# Patient Record
Sex: MALE | Race: BLACK OR AFRICAN AMERICAN | NOT HISPANIC OR LATINO | URBAN - METROPOLITAN AREA
[De-identification: names, ages, dates, MRNs, and addresses within clinical notes are randomized per-mention and may not be internally consistent; named-entity substitution may affect disease eponyms.]

---

## 2017-10-19 PROBLEM — Z00.00 ENCOUNTER FOR PREVENTIVE HEALTH EXAMINATION: Status: ACTIVE | Noted: 2017-10-19

## 2019-10-30 ENCOUNTER — INPATIENT (INPATIENT)
Facility: HOSPITAL | Age: 84
LOS: 4 days | Discharge: HOME HEALTH | End: 2019-11-04
Attending: HOSPITALIST | Admitting: HOSPITALIST
Payer: MEDICARE

## 2019-10-30 VITALS
RESPIRATION RATE: 18 BRPM | SYSTOLIC BLOOD PRESSURE: 128 MMHG | DIASTOLIC BLOOD PRESSURE: 79 MMHG | HEART RATE: 89 BPM | TEMPERATURE: 98 F

## 2019-10-30 DIAGNOSIS — Z29.9 ENCOUNTER FOR PROPHYLACTIC MEASURES, UNSPECIFIED: ICD-10-CM

## 2019-10-30 DIAGNOSIS — E11.9 TYPE 2 DIABETES MELLITUS WITHOUT COMPLICATIONS: ICD-10-CM

## 2019-10-30 DIAGNOSIS — Z87.81 PERSONAL HISTORY OF (HEALED) TRAUMATIC FRACTURE: Chronic | ICD-10-CM

## 2019-10-30 DIAGNOSIS — L03.90 CELLULITIS, UNSPECIFIED: ICD-10-CM

## 2019-10-30 DIAGNOSIS — N18.9 CHRONIC KIDNEY DISEASE, UNSPECIFIED: ICD-10-CM

## 2019-10-30 LAB
ALBUMIN SERPL ELPH-MCNC: 3.8 G/DL — SIGNIFICANT CHANGE UP (ref 3.3–5)
ALBUMIN SERPL ELPH-MCNC: 4.1 G/DL — SIGNIFICANT CHANGE UP (ref 3.3–5)
ALP SERPL-CCNC: 83 U/L — SIGNIFICANT CHANGE UP (ref 40–120)
ALP SERPL-CCNC: 93 U/L — SIGNIFICANT CHANGE UP (ref 40–120)
ALT FLD-CCNC: 18 U/L — SIGNIFICANT CHANGE UP (ref 4–41)
ALT FLD-CCNC: 23 U/L — SIGNIFICANT CHANGE UP (ref 4–41)
ANION GAP SERPL CALC-SCNC: 12 MMO/L — SIGNIFICANT CHANGE UP (ref 7–14)
ANION GAP SERPL CALC-SCNC: 13 MMO/L — SIGNIFICANT CHANGE UP (ref 7–14)
ANION GAP SERPL CALC-SCNC: 13 MMO/L — SIGNIFICANT CHANGE UP (ref 7–14)
AST SERPL-CCNC: 17 U/L — SIGNIFICANT CHANGE UP (ref 4–40)
AST SERPL-CCNC: 36 U/L — SIGNIFICANT CHANGE UP (ref 4–40)
BASE EXCESS BLDV CALC-SCNC: 3.2 MMOL/L — SIGNIFICANT CHANGE UP
BASOPHILS # BLD AUTO: 0.03 K/UL — SIGNIFICANT CHANGE UP (ref 0–0.2)
BASOPHILS NFR BLD AUTO: 0.3 % — SIGNIFICANT CHANGE UP (ref 0–2)
BILIRUB SERPL-MCNC: 0.5 MG/DL — SIGNIFICANT CHANGE UP (ref 0.2–1.2)
BILIRUB SERPL-MCNC: 0.5 MG/DL — SIGNIFICANT CHANGE UP (ref 0.2–1.2)
BLOOD GAS VENOUS - CREATININE: 1.42 MG/DL — HIGH (ref 0.5–1.3)
BUN SERPL-MCNC: 23 MG/DL — SIGNIFICANT CHANGE UP (ref 7–23)
BUN SERPL-MCNC: 23 MG/DL — SIGNIFICANT CHANGE UP (ref 7–23)
BUN SERPL-MCNC: 24 MG/DL — HIGH (ref 7–23)
CALCIUM SERPL-MCNC: 9.4 MG/DL — SIGNIFICANT CHANGE UP (ref 8.4–10.5)
CALCIUM SERPL-MCNC: 9.4 MG/DL — SIGNIFICANT CHANGE UP (ref 8.4–10.5)
CALCIUM SERPL-MCNC: 9.7 MG/DL — SIGNIFICANT CHANGE UP (ref 8.4–10.5)
CHLORIDE BLDV-SCNC: 107 MMOL/L — SIGNIFICANT CHANGE UP (ref 96–108)
CHLORIDE SERPL-SCNC: 102 MMOL/L — SIGNIFICANT CHANGE UP (ref 98–107)
CHLORIDE SERPL-SCNC: 103 MMOL/L — SIGNIFICANT CHANGE UP (ref 98–107)
CHLORIDE SERPL-SCNC: 103 MMOL/L — SIGNIFICANT CHANGE UP (ref 98–107)
CO2 SERPL-SCNC: 26 MMOL/L — SIGNIFICANT CHANGE UP (ref 22–31)
CO2 SERPL-SCNC: 26 MMOL/L — SIGNIFICANT CHANGE UP (ref 22–31)
CO2 SERPL-SCNC: 27 MMOL/L — SIGNIFICANT CHANGE UP (ref 22–31)
CREAT SERPL-MCNC: 1.52 MG/DL — HIGH (ref 0.5–1.3)
CREAT SERPL-MCNC: 1.55 MG/DL — HIGH (ref 0.5–1.3)
CREAT SERPL-MCNC: 1.55 MG/DL — HIGH (ref 0.5–1.3)
EOSINOPHIL # BLD AUTO: 0.17 K/UL — SIGNIFICANT CHANGE UP (ref 0–0.5)
EOSINOPHIL NFR BLD AUTO: 1.6 % — SIGNIFICANT CHANGE UP (ref 0–6)
GAS PNL BLDV: 138 MMOL/L — SIGNIFICANT CHANGE UP (ref 136–146)
GLUCOSE BLDV-MCNC: 120 MG/DL — HIGH (ref 70–99)
GLUCOSE SERPL-MCNC: 121 MG/DL — HIGH (ref 70–99)
GLUCOSE SERPL-MCNC: 125 MG/DL — HIGH (ref 70–99)
GLUCOSE SERPL-MCNC: 192 MG/DL — HIGH (ref 70–99)
HCO3 BLDV-SCNC: 24 MMOL/L — SIGNIFICANT CHANGE UP (ref 20–27)
HCT VFR BLD CALC: 45.7 % — SIGNIFICANT CHANGE UP (ref 39–50)
HCT VFR BLDV CALC: 48.8 % — SIGNIFICANT CHANGE UP (ref 39–51)
HGB BLD-MCNC: 15.5 G/DL — SIGNIFICANT CHANGE UP (ref 13–17)
HGB BLDV-MCNC: 16 G/DL — SIGNIFICANT CHANGE UP (ref 13–17)
IMM GRANULOCYTES NFR BLD AUTO: 0.7 % — SIGNIFICANT CHANGE UP (ref 0–1.5)
LACTATE BLDV-MCNC: 2.1 MMOL/L — HIGH (ref 0.5–2)
LYMPHOCYTES # BLD AUTO: 2.27 K/UL — SIGNIFICANT CHANGE UP (ref 1–3.3)
LYMPHOCYTES # BLD AUTO: 20.9 % — SIGNIFICANT CHANGE UP (ref 13–44)
MAGNESIUM SERPL-MCNC: 2 MG/DL — SIGNIFICANT CHANGE UP (ref 1.6–2.6)
MCHC RBC-ENTMCNC: 32.8 PG — SIGNIFICANT CHANGE UP (ref 27–34)
MCHC RBC-ENTMCNC: 33.9 % — SIGNIFICANT CHANGE UP (ref 32–36)
MCV RBC AUTO: 96.8 FL — SIGNIFICANT CHANGE UP (ref 80–100)
MONOCYTES # BLD AUTO: 0.96 K/UL — HIGH (ref 0–0.9)
MONOCYTES NFR BLD AUTO: 8.8 % — SIGNIFICANT CHANGE UP (ref 2–14)
NEUTROPHILS # BLD AUTO: 7.35 K/UL — SIGNIFICANT CHANGE UP (ref 1.8–7.4)
NEUTROPHILS NFR BLD AUTO: 67.7 % — SIGNIFICANT CHANGE UP (ref 43–77)
NRBC # FLD: 0 K/UL — SIGNIFICANT CHANGE UP (ref 0–0)
PCO2 BLDV: 58 MMHG — HIGH (ref 41–51)
PH BLDV: 7.32 PH — SIGNIFICANT CHANGE UP (ref 7.32–7.43)
PHOSPHATE SERPL-MCNC: 3.5 MG/DL — SIGNIFICANT CHANGE UP (ref 2.5–4.5)
PLATELET # BLD AUTO: 263 K/UL — SIGNIFICANT CHANGE UP (ref 150–400)
PMV BLD: 9.1 FL — SIGNIFICANT CHANGE UP (ref 7–13)
PO2 BLDV: 25 MMHG — LOW (ref 35–40)
POTASSIUM BLDV-SCNC: 5.1 MMOL/L — HIGH (ref 3.4–4.5)
POTASSIUM SERPL-MCNC: 4.3 MMOL/L — SIGNIFICANT CHANGE UP (ref 3.5–5.3)
POTASSIUM SERPL-MCNC: 4.6 MMOL/L — SIGNIFICANT CHANGE UP (ref 3.5–5.3)
POTASSIUM SERPL-MCNC: 5.9 MMOL/L — HIGH (ref 3.5–5.3)
POTASSIUM SERPL-SCNC: 4.3 MMOL/L — SIGNIFICANT CHANGE UP (ref 3.5–5.3)
POTASSIUM SERPL-SCNC: 4.6 MMOL/L — SIGNIFICANT CHANGE UP (ref 3.5–5.3)
POTASSIUM SERPL-SCNC: 5.9 MMOL/L — HIGH (ref 3.5–5.3)
PROT SERPL-MCNC: 7.5 G/DL — SIGNIFICANT CHANGE UP (ref 6–8.3)
PROT SERPL-MCNC: 8.5 G/DL — HIGH (ref 6–8.3)
RBC # BLD: 4.72 M/UL — SIGNIFICANT CHANGE UP (ref 4.2–5.8)
RBC # FLD: 12.2 % — SIGNIFICANT CHANGE UP (ref 10.3–14.5)
SAO2 % BLDV: 32.2 % — LOW (ref 60–85)
SODIUM SERPL-SCNC: 141 MMOL/L — SIGNIFICANT CHANGE UP (ref 135–145)
SODIUM SERPL-SCNC: 142 MMOL/L — SIGNIFICANT CHANGE UP (ref 135–145)
SODIUM SERPL-SCNC: 142 MMOL/L — SIGNIFICANT CHANGE UP (ref 135–145)
WBC # BLD: 10.86 K/UL — HIGH (ref 3.8–10.5)
WBC # FLD AUTO: 10.86 K/UL — HIGH (ref 3.8–10.5)

## 2019-10-30 PROCEDURE — 73630 X-RAY EXAM OF FOOT: CPT | Mod: 26,RT

## 2019-10-30 PROCEDURE — 93971 EXTREMITY STUDY: CPT | Mod: 26,LT

## 2019-10-30 PROCEDURE — 73610 X-RAY EXAM OF ANKLE: CPT | Mod: 26,RT

## 2019-10-30 PROCEDURE — 99223 1ST HOSP IP/OBS HIGH 75: CPT

## 2019-10-30 RX ORDER — INSULIN LISPRO 100/ML
VIAL (ML) SUBCUTANEOUS
Refills: 0 | Status: DISCONTINUED | OUTPATIENT
Start: 2019-10-30 | End: 2019-11-04

## 2019-10-30 RX ORDER — SODIUM CHLORIDE 9 MG/ML
1000 INJECTION, SOLUTION INTRAVENOUS
Refills: 0 | Status: DISCONTINUED | OUTPATIENT
Start: 2019-10-30 | End: 2019-11-04

## 2019-10-30 RX ORDER — GLUCAGON INJECTION, SOLUTION 0.5 MG/.1ML
1 INJECTION, SOLUTION SUBCUTANEOUS ONCE
Refills: 0 | Status: DISCONTINUED | OUTPATIENT
Start: 2019-10-30 | End: 2019-11-04

## 2019-10-30 RX ORDER — DEXTROSE 50 % IN WATER 50 %
25 SYRINGE (ML) INTRAVENOUS ONCE
Refills: 0 | Status: DISCONTINUED | OUTPATIENT
Start: 2019-10-30 | End: 2019-11-04

## 2019-10-30 RX ORDER — SODIUM CHLORIDE 9 MG/ML
1000 INJECTION INTRAMUSCULAR; INTRAVENOUS; SUBCUTANEOUS
Refills: 0 | Status: COMPLETED | OUTPATIENT
Start: 2019-10-30 | End: 2019-10-30

## 2019-10-30 RX ORDER — DEXTROSE 50 % IN WATER 50 %
12.5 SYRINGE (ML) INTRAVENOUS ONCE
Refills: 0 | Status: DISCONTINUED | OUTPATIENT
Start: 2019-10-30 | End: 2019-11-04

## 2019-10-30 RX ORDER — SITAGLIPTIN 50 MG/1
1 TABLET, FILM COATED ORAL
Qty: 0 | Refills: 0 | DISCHARGE

## 2019-10-30 RX ORDER — INSULIN LISPRO 100/ML
VIAL (ML) SUBCUTANEOUS AT BEDTIME
Refills: 0 | Status: DISCONTINUED | OUTPATIENT
Start: 2019-10-30 | End: 2019-11-04

## 2019-10-30 RX ORDER — DEXTROSE 50 % IN WATER 50 %
15 SYRINGE (ML) INTRAVENOUS ONCE
Refills: 0 | Status: DISCONTINUED | OUTPATIENT
Start: 2019-10-30 | End: 2019-11-04

## 2019-10-30 RX ORDER — HEPARIN SODIUM 5000 [USP'U]/ML
5000 INJECTION INTRAVENOUS; SUBCUTANEOUS EVERY 8 HOURS
Refills: 0 | Status: DISCONTINUED | OUTPATIENT
Start: 2019-10-30 | End: 2019-11-04

## 2019-10-30 RX ORDER — IBUPROFEN 200 MG
400 TABLET ORAL ONCE
Refills: 0 | Status: COMPLETED | OUTPATIENT
Start: 2019-10-30 | End: 2019-10-30

## 2019-10-30 RX ADMIN — Medication 100 MILLIGRAM(S): at 18:28

## 2019-10-30 RX ADMIN — SODIUM CHLORIDE 100 MILLILITER(S): 9 INJECTION INTRAMUSCULAR; INTRAVENOUS; SUBCUTANEOUS at 22:20

## 2019-10-30 RX ADMIN — Medication 400 MILLIGRAM(S): at 21:50

## 2019-10-30 NOTE — H&P ADULT - PROBLEM SELECTOR PLAN 3
- Unclear baseline.  - Trend Cr level.  - Will give gentle IV hydration overnight given clinical exam findings and mildly elevated lactate.

## 2019-10-30 NOTE — ED PROVIDER NOTE - PHYSICAL EXAMINATION
Vital signs reviewed.   CONSTITUTIONAL: Well-appearing; well-nourished; in no apparent distress. Non-toxic appearing.   HEAD: Normocephalic, atraumatic.  EYES: PERRL, EOM intact, conjunctiva and sclera WNL.  ENT: normal nose; no rhinorrhea;  NECK: Trachea midline   CARD: Normal S1, S2; no murmurs, rubs, or gallops noted.  RESP: Normal chest excursion with respiration; breath sounds clear and equal bilaterally; no wheezes, rhonchi, or rales.  EXT/MS: moves all extremities; distal pulses are normal, +RLE swelling extending from calf to foot. +pain noted to posterior calf. Also noted to have dorsal skin dermatitis with oozing. +mild warmth noted to anterior ankle. No crepitus.   SKIN: Normal for age and race; warm; dry; good turgor; See Ext section .  NEURO: Awake, alert, oriented x 3, no gross deficits, no motor or sensory deficit noted.  PSYCH: Normal mood; appropriate affect.

## 2019-10-30 NOTE — H&P ADULT - NSHPLABSRESULTS_GEN_ALL_CORE
(10-30 @ 17:37)                      15.5  10.86 )-----------( 263                 45.7    Neutrophils = 7.35 (67.7%)  Lymphocytes = 2.27 (20.9%)  Eosinophils = 0.17 (1.6%)  Basophils = 0.03 (0.3%)  Monocytes = 0.96 (8.8%)  Bands = --%    10-30    141  |  103  |  24<H>  ----------------------------<  125<H>  5.9<H>   |  26  |  1.52<H>    Ca    9.7      30 Oct 2019 17:37    TPro  8.5<H>  /  Alb  4.1  /  TBili  0.5  /  DBili  x   /  AST  36  /  ALT  23  /  AlkPhos  93  10-30          RVP:    Venous Blood Gas:  10-30 @ 17:37  7.32/58/25/24/32.2  VBG Lactate: 2.1    Xray: reviewed. No acute findings.

## 2019-10-30 NOTE — H&P ADULT - ASSESSMENT
87 y.o male with PMHx of DM who presents to ED with daughter c/o RLE swelling x 1 week and oozing of skin x 3 days. Concerning for LE cellulitis.

## 2019-10-30 NOTE — ED ADULT NURSE NOTE - OBJECTIVE STATEMENT
patient alert ox3 came in c/o pain to right foot with ulcers since 1 week. denies having fever, NAD. breathing even and unlabored. labs done as ordered. awaiting results and re eval.

## 2019-10-30 NOTE — ED PROVIDER NOTE - CLINICAL SUMMARY MEDICAL DECISION MAKING FREE TEXT BOX
Patient is a 87 y.o male with PMHx of DM, Rt ankle fx who presents to ED with daughter c/o RLE swelling x 1 week and oozing of skin x 3 days.  DDx- dermatitis with cellulitis. R/o DVT. plan- labs, xray , Doppler, IV clinda, will monitor and reassess

## 2019-10-30 NOTE — H&P ADULT - PROBLEM SELECTOR PLAN 1
- Likely 2/2 chronic venous skin changes and bacterial infection.   - Treat empirically with IV abx clinda.  - Trend fever and leukocytosis curves. If worsening sx, will send BCx.  - Wound care consult.

## 2019-10-30 NOTE — ED PROVIDER NOTE - OBJECTIVE STATEMENT
HPI: Patient is a 87 y.o male with PMHx of DM, Rt ankle fx who presents to ED with daughter c/o RLE swelling x 1 week and oozing of skin x 3 days.  Pt recently in [facility] x 1 month, returned last night. Notes that for approx. 1 week he has had RLE swelling from calf to foot. Then 3 days ago he noted flaking of skin with oozing, Pt notes pain making it difficult to ambulate. Pt previously had sx to Rt ankle 4 yrs ago, states rods were placed but then removed 1 year later. Pt denies fevers, chills, cp, sob, pleuritic pain, numbness or tingling, weakness or any other complaints.

## 2019-10-30 NOTE — H&P ADULT - NSHPPHYSICALEXAM_GEN_ALL_CORE
T(C): 36.6 (10-30-19 @ 14:11), Max: 36.6 (10-30-19 @ 14:11)  HR: 85 (10-30-19 @ 17:47) (85 - 89)  BP: 118/60 (10-30-19 @ 17:47) (118/60 - 128/79)  RR: 17 (10-30-19 @ 17:47) (17 - 18)  SpO2: 98% (10-30-19 @ 17:47) (98% - 98%)  Wt(kg): --  GENERAL: NAD, well-developed  HEAD:  Atraumatic, Normocephalic  EYES: EOMI, PERRLA, conjunctiva and sclera clear  NECK: Supple, No JVD  CHEST/LUNG: Clear to auscultation bilaterally; No wheeze  HEART: Regular rate and rhythm; No murmurs, rubs, or gallops  ABDOMEN: Soft, Nontender, Nondistended; Bowel sounds present  EXTREMITIES:  Left LE swelling, skin warm to touch in foot and ankle area. Scaling overlying skin.  PSYCH: AAOx3  NEUROLOGY: non-focal  SKIN: No rashes or lesions

## 2019-10-30 NOTE — H&P ADULT - HISTORY OF PRESENT ILLNESS
87 y.o male with PMHx of DM who presents to ED with daughter c/o RLE swelling x 1 week and oozing of skin x 3 days.  Pt recently in [facility] x 1 month, returned last night. Notes that for approx. 1 week he has had RLE swelling from calf to foot. Then 3 days ago he noted flaking of skin with oozing, Pt notes pain making it difficult to ambulate. Pt previously had sx to Rt ankle 4 yrs ago, states rods were placed but then removed 1 year later. Pt denies fevers, chills, cp, sob, pleuritic pain, numbness or tingling, weakness or any other complaints.    In the ED, noted VSSAF. Given clinda for cellulitis diagnosis. DVT study negative.

## 2019-10-30 NOTE — ED PROVIDER NOTE - ATTENDING CONTRIBUTION TO CARE
Ava: I have seen and examined the patient face to face, have reviewed and addended the HPI, PE and a/p as necessary.     87 year old male DM, right ankle fracture a/w RLE swelling and erythema, and oozing of skin x 3 days.  Noted to have recently come back from [facility].  Reports now having weeping over the rle.  Reports over the past week, he was not able to ambulate too well, and reports feeling like he may fall.    No fever/chills, No photophobia/eye pain/changes in vision, No ear pain/sore throat/dysphagia, No chest pain/palpitations, no SOB/cough/wheeze/stridor, No abdominal pain, No N/V/D, no dysuria/frequency/discharge, No neck/back pain, no rash, no changes in neurological status/function.     GEN - NAD; well appearing; A+O x3; non-toxic appearing  CARD -s1s2, RRR, no M,G,R;   PULM - CTA b/l, symmetric breath sounds;   ABD -  +BS, ND, NT, soft, no guarding, no rebound, no masses;   BACK - no CVA tenderness, Normal  spine;   EXT - symmetric pulses, 2+ dp, capillary refill < 2 seconds, no clubbing, no cyanosis. + edema with weeping and erythema overlying  up to mid calf with associated pitting edema.    NEURO - no focal neuro deficits, no slurred speech    88 yo M DM a/w RLE swelling concerning for cellulitis with edema, and recent travel, concern for possible acute dvt.  Will check us to r/o dvt and xray to r/o free air.  Reassess.

## 2019-10-31 LAB
BASE EXCESS BLDV CALC-SCNC: 1.7 MMOL/L — SIGNIFICANT CHANGE UP
BLOOD GAS VENOUS - CREATININE: 1.21 MG/DL — SIGNIFICANT CHANGE UP (ref 0.5–1.3)
CHLORIDE BLDV-SCNC: 108 MMOL/L — SIGNIFICANT CHANGE UP (ref 96–108)
GAS PNL BLDV: 136 MMOL/L — SIGNIFICANT CHANGE UP (ref 136–146)
GLUCOSE BLDC GLUCOMTR-MCNC: 100 MG/DL — HIGH (ref 70–99)
GLUCOSE BLDC GLUCOMTR-MCNC: 141 MG/DL — HIGH (ref 70–99)
GLUCOSE BLDC GLUCOMTR-MCNC: 150 MG/DL — HIGH (ref 70–99)
GLUCOSE BLDC GLUCOMTR-MCNC: 95 MG/DL — SIGNIFICANT CHANGE UP (ref 70–99)
GLUCOSE BLDV-MCNC: 124 MG/DL — HIGH (ref 70–99)
HBA1C BLD-MCNC: 6.9 % — HIGH (ref 4–5.6)
HCO3 BLDV-SCNC: 23 MMOL/L — SIGNIFICANT CHANGE UP (ref 20–27)
HCT VFR BLDV CALC: 43.9 % — SIGNIFICANT CHANGE UP (ref 39–51)
HGB BLDV-MCNC: 14.3 G/DL — SIGNIFICANT CHANGE UP (ref 13–17)
LACTATE BLDV-MCNC: 3.7 MMOL/L — HIGH (ref 0.5–2)
PCO2 BLDV: 67 MMHG — HIGH (ref 41–51)
PH BLDV: 7.25 PH — LOW (ref 7.32–7.43)
PO2 BLDV: 25 MMHG — LOW (ref 35–40)
POTASSIUM BLDV-SCNC: 3.9 MMOL/L — SIGNIFICANT CHANGE UP (ref 3.4–4.5)
SAO2 % BLDV: 29.1 % — LOW (ref 60–85)

## 2019-10-31 PROCEDURE — 99233 SBSQ HOSP IP/OBS HIGH 50: CPT | Mod: GC

## 2019-10-31 RX ORDER — LACTOBACILLUS ACIDOPHILUS 100MM CELL
1 CAPSULE ORAL DAILY
Refills: 0 | Status: DISCONTINUED | OUTPATIENT
Start: 2019-10-31 | End: 2019-11-04

## 2019-10-31 RX ADMIN — HEPARIN SODIUM 5000 UNIT(S): 5000 INJECTION INTRAVENOUS; SUBCUTANEOUS at 22:54

## 2019-10-31 RX ADMIN — Medication 100 MILLIGRAM(S): at 17:30

## 2019-10-31 RX ADMIN — Medication 100 MILLIGRAM(S): at 09:12

## 2019-10-31 RX ADMIN — Medication 100 MILLIGRAM(S): at 01:42

## 2019-10-31 RX ADMIN — Medication 1 TABLET(S): at 13:02

## 2019-10-31 RX ADMIN — HEPARIN SODIUM 5000 UNIT(S): 5000 INJECTION INTRAVENOUS; SUBCUTANEOUS at 05:41

## 2019-10-31 RX ADMIN — HEPARIN SODIUM 5000 UNIT(S): 5000 INJECTION INTRAVENOUS; SUBCUTANEOUS at 13:03

## 2019-10-31 NOTE — PHYSICAL THERAPY INITIAL EVALUATION ADULT - RANGE OF MOTION EXAMINATION, REHAB EVAL
bilateral lower extremity ROM was WFL (within functional limits)/right ankle ROM limited compared to left, however still WFL/bilateral upper extremity ROM was WFL (within functional limits)

## 2019-10-31 NOTE — PROGRESS NOTE ADULT - PROBLEM SELECTOR PLAN 1
- Likely 2/2 chronic venous skin changes and bacterial infection.   - Treat empirically with IV abx clinda.  - Trend fever and leukocytosis curves. If worsening sx, will send BCx.  - Wound care consult. - Likely 2/2 chronic venous skin changes and bacterial infection.   - Treat empirically with IV abx clinda with plans to transition tomorrow  - Trend fever and leukocytosis curves. If worsening sx, will send BCx.  - Wound care consult.

## 2019-10-31 NOTE — PROGRESS NOTE ADULT - ASSESSMENT
87 y.o male with PMHx of DM who presents to ED with daughter c/o RLE swelling x 1 week and oozing of skin x 3 days, with suspicion for LE cellulitis.

## 2019-10-31 NOTE — PHYSICAL THERAPY INITIAL EVALUATION ADULT - ADDITIONAL COMMENTS
Patient ambulates with straight cane in the community and no device at home prior to admission. Also owns rolling walker

## 2019-10-31 NOTE — PHYSICAL THERAPY INITIAL EVALUATION ADULT - CRITERIA FOR SKILLED THERAPEUTIC INTERVENTIONS
rehab potential/predicted duration of therapy intervention/anticipated discharge recommendation/functional limitations in following categories/impairments found/therapy frequency/risk reduction/prevention

## 2019-10-31 NOTE — ADVANCED PRACTICE NURSE CONSULT - ASSESSMENT
Vascular: Bilateral lower extremities with scattered areas of hyper and hypopigmentation. (+) hemosiderin staining of gaiter region. RLE with severe dry flaky skin beginning at malleolus to distal toes. Taught skin of RLE. Thickened-hyperpigmented toenails. No temperature changes noted. Increased coolness from midfoot to distal toes. +4 pitting edema bilaterally. Capillary refill >3 seconds on right foot, <3 seconds on left foot. Right foot+1 DP/PT pulses, Left foot +2 DP/PT pulses, with faint biphasic doppler sounds bilaterally. Patient denies numbness and/or tingling of bilateral lower extremities.     RLE- malleolus to distal toes- no wounds present, (+) severe dry-flaky skin, no increased warmth, no open ulcerations noted. Resolving cellulitis versus venous dermatitis.    Risk for moisture associated dermatitis- chronic hyperpigmentation within sacral fold.

## 2019-10-31 NOTE — ADVANCED PRACTICE NURSE CONSULT - RECOMMEDATIONS
Obtain POLLY/PVR of RLE to guide level of compression.   Recommend follow up care at [facility] ([phone]. Address: [address] [place] [address]) or outpatient Vascular MD.    Topical Recommendations:  RLE- Gently cleanse with chlorhexidine soap scrub. Dry well. Apply Sween 24 daily.    Continue low air loss bed therapy, continue heel elevation while in bed, continue to encourage turning & repositioning q2h, continue moisture/incontinence management with yuriy barrier creams & single breathable pad, continue measures to decrease friction/shear/pressure.     Continue with nutritional support as per dietary/orders.    Findings and plan discussed with patient and primary team. Education provided regarding importance of hydration of skin to decrease dry/flaky skin and risk for portal of entries. Discussed importance of compression stocking usage and follow up at wound care center.    Please contact Wound Care Service Line if we can be of further assistance (ext [phone]).

## 2019-10-31 NOTE — PROGRESS NOTE ADULT - PROBLEM SELECTOR PLAN 3
The enoxaparin dose has been adjusted for Bird Feng [id number] according to the pharmacy practice protocol.      Based on the patient's Estimated Creatinine Clearance: 173.8 mL/min (based on SCr of 0.8 mg/dL)., Body mass index is 46.25 kg/m²., and weight= (!) 167.8 kg (370 lb), the enoxaparin dose has been adjusted 40 mg every 12 hours for CrCl =/>30 and BMI=/>40.     Thank you,  Johan Baez       - Unclear baseline.  - Trend Cr level.  - Will give gentle IV hydration overnight given clinical exam findings and mildly elevated lactate.

## 2019-10-31 NOTE — PROGRESS NOTE ADULT - SUBJECTIVE AND OBJECTIVE BOX
Arian Angela MD  Beeper: [phone] ([facility])/ [id number] ([facility])     Subjective:    Patient is a 87y old  Male who presents with a chief complaint of LLE swelling and pain (30 Oct 2019 21:23)      Overnight events:    MEDICATIONS  (STANDING):  clindamycin IVPB 600 milliGRAM(s) IV Intermittent every 8 hours  dextrose 5%. 1000 milliLiter(s) (50 mL/Hr) IV Continuous <Continuous>  dextrose 50% Injectable 12.5 Gram(s) IV Push once  dextrose 50% Injectable 25 Gram(s) IV Push once  dextrose 50% Injectable 25 Gram(s) IV Push once  heparin  Injectable 5000 Unit(s) SubCutaneous every 8 hours  insulin lispro (HumaLOG) corrective regimen sliding scale   SubCutaneous three times a day before meals  insulin lispro (HumaLOG) corrective regimen sliding scale   SubCutaneous at bedtime    MEDICATIONS  (PRN):  dextrose 40% Gel 15 Gram(s) Oral once PRN Blood Glucose LESS THAN 70 milliGRAM(s)/deciliter  glucagon  Injectable 1 milliGRAM(s) IntraMuscular once PRN Glucose LESS THAN 70 milligrams/deciliter      Objective:    Vitals: Vital Signs Last 24 Hrs  T(C): 36.6 (10-31-19 @ 05:37), Max: 36.6 (10-30-19 @ 14:11)  T(F): 97.9 (10-31-19 @ 05:37), Max: 97.9 (10-30-19 @ 14:11)  HR: 73 (10-31-19 @ 05:37) (73 - 89)  BP: 110/69 (10-31-19 @ 05:37) (110/69 - 128/79)  BP(mean): --  RR: 18 (10-31-19 @ 05:37) (16 - 18)  SpO2: 98% (10-31-19 @ 05:37) (97% - 99%)              I&O's Summary    30 Oct 2019 07:01  -  31 Oct 2019 07:00  --------------------------------------------------------  IN: 0 mL / OUT: 500 mL / NET: -500 mL        PHYSICAL EXAM:  GENERAL: NAD, well-groomed, well-developed  HEAD:  Atraumatic, Normocephalic  EYES: EOMI, PERRLA, conjunctiva and sclera clear  ENMT: No tonsillar erythema, exudates, or enlargement; Moist mucous membranes, Good dentition, No lesions  NECK: Supple, No JVD, Normal thyroid  CHEST/LUNG: Clear to percussion bilaterally; No rales, rhonchi, wheezing, or rubs  HEART: Regular rate and rhythm; No murmurs, rubs, or gallops  ABDOMEN: Soft, Nontender, Nondistended; Bowel sounds present  EXTREMITIES:  2+ Peripheral Pulses, No clubbing, cyanosis, or edema  LYMPH: No lymphadenopathy noted  SKIN: No rashes or lesions  NERVOUS SYSTEM:  Alert & Oriented X3, Good concentration; Motor Strength 5/5 B/L upper and lower extremities; DTRs 2+ intact and symmetric                                             LABS:  10-30    142  |  102  |  23  ----------------------------<  192<H>  4.3   |  27  |  1.55<H>  10-30    142  |  103  |  23  ----------------------------<  121<H>  4.6   |  26  |  1.55<H>  10-30    141  |  103  |  24<H>  ----------------------------<  125<H>  5.9<H>   |  26  |  1.52<H>    Ca    9.4      30 Oct 2019 21:29  Ca    9.4      30 Oct 2019 20:51  Ca    9.7      30 Oct 2019 17:37  Phos  3.5     10-30  Mg     2.0     10-30    TPro  7.5  /  Alb  3.8  /  TBili  0.5  /  DBili  x   /  AST  17  /  ALT  18  /  AlkPhos  83  10-30  TPro  8.5<H>  /  Alb  4.1  /  TBili  0.5  /  DBili  x   /  AST  36  /  ALT  23  /  AlkPhos  93  10-30                                                    15.5   10.86 )-----------( 263      ( 30 Oct 2019 17:37 )             45.7     CAPILLARY BLOOD GLUCOSE      POCT Blood Glucose.: 188 mg/dL (30 Oct 2019 22:03)  POCT Blood Glucose.: 123 mg/dL (30 Oct 2019 14:16) Arian Angela MD  Beeper: [phone] ([facility])/ [id number] ([facility])     Subjective:    Patient is a 87y old  Male who presents with a chief complaint of LLE swelling and pain (30 Oct 2019 21:23)      Overnight events: No acute events. Patient denies pain or itchiness of RLE. Only says it feels tight when it walks. Denies fever, chills, sob, cp, myalgias, nausea, vomiting.    MEDICATIONS  (STANDING):  clindamycin IVPB 600 milliGRAM(s) IV Intermittent every 8 hours  dextrose 5%. 1000 milliLiter(s) (50 mL/Hr) IV Continuous <Continuous>  dextrose 50% Injectable 12.5 Gram(s) IV Push once  dextrose 50% Injectable 25 Gram(s) IV Push once  dextrose 50% Injectable 25 Gram(s) IV Push once  heparin  Injectable 5000 Unit(s) SubCutaneous every 8 hours  insulin lispro (HumaLOG) corrective regimen sliding scale   SubCutaneous three times a day before meals  insulin lispro (HumaLOG) corrective regimen sliding scale   SubCutaneous at bedtime    MEDICATIONS  (PRN):  dextrose 40% Gel 15 Gram(s) Oral once PRN Blood Glucose LESS THAN 70 milliGRAM(s)/deciliter  glucagon  Injectable 1 milliGRAM(s) IntraMuscular once PRN Glucose LESS THAN 70 milligrams/deciliter      Objective:    Vitals: Vital Signs Last 24 Hrs  T(C): 36.6 (10-31-19 @ 05:37), Max: 36.6 (10-30-19 @ 14:11)  T(F): 97.9 (10-31-19 @ 05:37), Max: 97.9 (10-30-19 @ 14:11)  HR: 73 (10-31-19 @ 05:37) (73 - 89)  BP: 110/69 (10-31-19 @ 05:37) (110/69 - 128/79)  BP(mean): --  RR: 18 (10-31-19 @ 05:37) (16 - 18)  SpO2: 98% (10-31-19 @ 05:37) (97% - 99%)              I&O's Summary    30 Oct 2019 07:01  -  31 Oct 2019 07:00  --------------------------------------------------------  IN: 0 mL / OUT: 500 mL / NET: -500 mL        PHYSICAL EXAM:  GENERAL: NAD, well-developed  HEAD:  Atraumatic, Normocephalic  EYES: EOMI, PERRLA, conjunctiva and sclera clear  ENMT: No tonsillar erythema, exudates, or enlargement; Moist mucous membranes, No lesions  NECK: Supple, No JVD  CHEST/LUNG: Fine crackles at base bilaterally. No rales, rhonchi, wheezing, or rubs  HEART: Regular rate and rhythm; No murmurs, rubs, or gallops  ABDOMEN: Soft, Nontender, Nondistended; Bowel sounds present  EXTREMITIES:  2+ radial pulses. No clubbing, cyanosis, or edema. Unable to appreciate pedal pulses due to induration of skin. Venous stasis induration bilaterally. 1+ edema to midcalf bilaterally.  LYMPH: No lymphadenopathy noted  PSYCH: AAO x3  NERVOUS SYSTEM:  Alert & Oriented X3, Good concentration; Motor Strength 5/5 B/L upper and lower extremities                                            LABS:  10-30    142  |  102  |  23  ----------------------------<  192<H>  4.3   |  27  |  1.55<H>  10-30    142  |  103  |  23  ----------------------------<  121<H>  4.6   |  26  |  1.55<H>  10-30    141  |  103  |  24<H>  ----------------------------<  125<H>  5.9<H>   |  26  |  1.52<H>    Ca    9.4      30 Oct 2019 21:29  Ca    9.4      30 Oct 2019 20:51  Ca    9.7      30 Oct 2019 17:37  Phos  3.5     10-30  Mg     2.0     10-30    TPro  7.5  /  Alb  3.8  /  TBili  0.5  /  DBili  x   /  AST  17  /  ALT  18  /  AlkPhos  83  10-30  TPro  8.5<H>  /  Alb  4.1  /  TBili  0.5  /  DBili  x   /  AST  36  /  ALT  23  /  AlkPhos  93  10-30                                                    15.5   10.86 )-----------( 263      ( 30 Oct 2019 17:37 )             45.7     CAPILLARY BLOOD GLUCOSE      POCT Blood Glucose.: 188 mg/dL (30 Oct 2019 22:03)  POCT Blood Glucose.: 123 mg/dL (30 Oct 2019 14:16)

## 2019-10-31 NOTE — ADVANCED PRACTICE NURSE CONSULT - REASON FOR CONSULT
Patient seen on skin care rounds after wound care referral received for assessment of skin impairment and recommendations of topical management. Chart reviewed: WBC 10.86k/uL, H/H 15.5/45.7, Plt 263, PCOT 100-188, HgbA1C 6.9%, BMI 33.0kg/m2, Willie 20. Venous Duplex RLE (-) DVT, Right foot x-ray no acute fracture or dislocation, chronic healed bimalleolar fracture deformity. H/o DM who presents to ED with daughter c/o RLE swelling x 1 week and oozing of skin x 3 days. Concerning for LE cellulitis.  Full note to follow. Patient seen on skin care rounds after wound care referral received for assessment of skin impairment and recommendations of topical management. Chart reviewed: WBC 10.86k/uL, H/H 15.5/45.7, Plt 263, PCOT 100-188, HgbA1C 6.9%, BMI 33.0kg/m2, Willie 20. Venous Duplex RLE (-) DVT, Right foot x-ray no acute fracture or dislocation, chronic healed bimalleolar fracture deformity. H/o DM who presents to ED with daughter c/o RLE swelling x 1 week and oozing of skin x 3 days. Concerning for LE cellulitis. Patient interviewed, had surgical procedure for right ankle post fracture 5 years ago. Post surgery he was told that had poor circulation in the RLE. Two weeks ago he was in [facility], he stopped wearing his compression stockings, both legs became swollen with drainage but his right LE was much worse. Since hospitalization the swelling has improved.  Wears pull-up briefs because he has an enlarged prostate and dribbles urine at time.

## 2019-11-01 ENCOUNTER — TRANSCRIPTION ENCOUNTER (OUTPATIENT)
Age: 84
End: 2019-11-01

## 2019-11-01 LAB
ANION GAP SERPL CALC-SCNC: 12 MMO/L — SIGNIFICANT CHANGE UP (ref 7–14)
BUN SERPL-MCNC: 20 MG/DL — SIGNIFICANT CHANGE UP (ref 7–23)
CALCIUM SERPL-MCNC: 9.2 MG/DL — SIGNIFICANT CHANGE UP (ref 8.4–10.5)
CHLORIDE SERPL-SCNC: 103 MMOL/L — SIGNIFICANT CHANGE UP (ref 98–107)
CO2 SERPL-SCNC: 25 MMOL/L — SIGNIFICANT CHANGE UP (ref 22–31)
CREAT SERPL-MCNC: 1.31 MG/DL — HIGH (ref 0.5–1.3)
GLUCOSE BLDC GLUCOMTR-MCNC: 106 MG/DL — HIGH (ref 70–99)
GLUCOSE BLDC GLUCOMTR-MCNC: 170 MG/DL — HIGH (ref 70–99)
GLUCOSE BLDC GLUCOMTR-MCNC: 86 MG/DL — SIGNIFICANT CHANGE UP (ref 70–99)
GLUCOSE BLDC GLUCOMTR-MCNC: 96 MG/DL — SIGNIFICANT CHANGE UP (ref 70–99)
GLUCOSE SERPL-MCNC: 133 MG/DL — HIGH (ref 70–99)
HCT VFR BLD CALC: 45.8 % — SIGNIFICANT CHANGE UP (ref 39–50)
HGB BLD-MCNC: 14.9 G/DL — SIGNIFICANT CHANGE UP (ref 13–17)
MAGNESIUM SERPL-MCNC: 1.9 MG/DL — SIGNIFICANT CHANGE UP (ref 1.6–2.6)
MCHC RBC-ENTMCNC: 31.8 PG — SIGNIFICANT CHANGE UP (ref 27–34)
MCHC RBC-ENTMCNC: 32.5 % — SIGNIFICANT CHANGE UP (ref 32–36)
MCV RBC AUTO: 97.7 FL — SIGNIFICANT CHANGE UP (ref 80–100)
NRBC # FLD: 0 K/UL — SIGNIFICANT CHANGE UP (ref 0–0)
PHOSPHATE SERPL-MCNC: 2.9 MG/DL — SIGNIFICANT CHANGE UP (ref 2.5–4.5)
PLATELET # BLD AUTO: 250 K/UL — SIGNIFICANT CHANGE UP (ref 150–400)
PMV BLD: 9.5 FL — SIGNIFICANT CHANGE UP (ref 7–13)
POTASSIUM SERPL-MCNC: 4.6 MMOL/L — SIGNIFICANT CHANGE UP (ref 3.5–5.3)
POTASSIUM SERPL-SCNC: 4.6 MMOL/L — SIGNIFICANT CHANGE UP (ref 3.5–5.3)
RBC # BLD: 4.69 M/UL — SIGNIFICANT CHANGE UP (ref 4.2–5.8)
RBC # FLD: 12.2 % — SIGNIFICANT CHANGE UP (ref 10.3–14.5)
SODIUM SERPL-SCNC: 140 MMOL/L — SIGNIFICANT CHANGE UP (ref 135–145)
WBC # BLD: 8.38 K/UL — SIGNIFICANT CHANGE UP (ref 3.8–10.5)
WBC # FLD AUTO: 8.38 K/UL — SIGNIFICANT CHANGE UP (ref 3.8–10.5)

## 2019-11-01 PROCEDURE — 99233 SBSQ HOSP IP/OBS HIGH 50: CPT | Mod: GC

## 2019-11-01 RX ORDER — ASPIRIN/CALCIUM CARB/MAGNESIUM 324 MG
81 TABLET ORAL DAILY
Refills: 0 | Status: DISCONTINUED | OUTPATIENT
Start: 2019-11-01 | End: 2019-11-04

## 2019-11-01 RX ADMIN — Medication 100 MILLIGRAM(S): at 18:11

## 2019-11-01 RX ADMIN — Medication 100 MILLIGRAM(S): at 09:54

## 2019-11-01 RX ADMIN — HEPARIN SODIUM 5000 UNIT(S): 5000 INJECTION INTRAVENOUS; SUBCUTANEOUS at 06:40

## 2019-11-01 RX ADMIN — HEPARIN SODIUM 5000 UNIT(S): 5000 INJECTION INTRAVENOUS; SUBCUTANEOUS at 22:21

## 2019-11-01 RX ADMIN — Medication 1 TABLET(S): at 13:22

## 2019-11-01 RX ADMIN — Medication 100 MILLIGRAM(S): at 02:08

## 2019-11-01 NOTE — DISCHARGE NOTE PROVIDER - NSDCMRMEDTOKEN_GEN_ALL_CORE_FT
Artificial Tears ophthalmic solution: 1 drop(s) to each affected eye 4 times a day, As Needed  aspirin 81 mg oral delayed release tablet: 1 tab(s) orally once a day  Cod Liver Oil oral capsule: 1 cap(s) orally once a day  Januvia 100 mg oral tablet: 1 tab(s) orally once a day Artificial Tears ophthalmic solution: 1 drop(s) to each affected eye 4 times a day, As Needed  aspirin 81 mg oral delayed release tablet: 1 tab(s) orally once a day  Cleocin HCl 300 mg oral capsule: 1 cap(s) orally every 6 hours for 2 days. Last dose will be taken on 11/5.  Cod Liver Oil oral capsule: 1 cap(s) orally once a day  Januvia 100 mg oral tablet: 1 tab(s) orally once a day

## 2019-11-01 NOTE — DISCHARGE NOTE NURSING/CASE MANAGEMENT/SOCIAL WORK - NSDCPNINST_GEN_ALL_CORE
pt alert and oriented, stable to discharge home. pt afebrile, vitals stable.  pt had right ankle healed wound with dry skin. pt is minimal assist, ambulates with cane. IV removed. Discharge instruction provided with copy. educational printed material provided on cellulitis, diabetes.

## 2019-11-01 NOTE — DISCHARGE NOTE NURSING/CASE MANAGEMENT/SOCIAL WORK - PATIENT PORTAL LINK FT
You can access the FollowMyHealth Patient Portal offered by [facility] by registering at the following website: [email or web address]. By joining [employer]’s FollowMyHealth portal, you will also be able to view your health information using other applications (apps) compatible with our system.

## 2019-11-01 NOTE — PROGRESS NOTE ADULT - SUBJECTIVE AND OBJECTIVE BOX
Patient is a 87y old  Male who presents with a chief complaint of LLE swelling and pain (01 Nov 2019 06:26)      SUBJECTIVE / OVERNIGHT EVENTS: No acute events overnight. Patient seen and examined at bedside. Feels well today. Had pain last night when trying to walk. Put on cream and pain improved. Denies n/v/f/c/cp/sob and myalgias.     MEDICATIONS  (STANDING):  clindamycin IVPB 600 milliGRAM(s) IV Intermittent every 8 hours  dextrose 5%. 1000 milliLiter(s) (50 mL/Hr) IV Continuous <Continuous>  dextrose 50% Injectable 12.5 Gram(s) IV Push once  dextrose 50% Injectable 25 Gram(s) IV Push once  dextrose 50% Injectable 25 Gram(s) IV Push once  heparin  Injectable 5000 Unit(s) SubCutaneous every 8 hours  insulin lispro (HumaLOG) corrective regimen sliding scale   SubCutaneous three times a day before meals  insulin lispro (HumaLOG) corrective regimen sliding scale   SubCutaneous at bedtime  lactobacillus acidophilus 1 Tablet(s) Oral daily    MEDICATIONS  (PRN):  dextrose 40% Gel 15 Gram(s) Oral once PRN Blood Glucose LESS THAN 70 milliGRAM(s)/deciliter  glucagon  Injectable 1 milliGRAM(s) IntraMuscular once PRN Glucose LESS THAN 70 milligrams/deciliter      Vital Signs Last 24 Hrs  T(C): 36.8 (01 Nov 2019 06:38), Max: 36.8 (01 Nov 2019 06:38)  T(F): 98.2 (01 Nov 2019 06:38), Max: 98.2 (01 Nov 2019 06:38)  HR: 77 (01 Nov 2019 06:38) (77 - 78)  BP: 133/74 (01 Nov 2019 06:38) (107/56 - 133/74)  BP(mean): --  RR: 18 (01 Nov 2019 06:38) (17 - 18)  SpO2: 98% (01 Nov 2019 06:38) (94% - 100%)  CAPILLARY BLOOD GLUCOSE      POCT Blood Glucose.: 106 mg/dL (01 Nov 2019 09:07)  POCT Blood Glucose.: 141 mg/dL (31 Oct 2019 22:22)  POCT Blood Glucose.: 95 mg/dL (31 Oct 2019 18:01)  POCT Blood Glucose.: 150 mg/dL (31 Oct 2019 13:00)    I&O's Summary    31 Oct 2019 07:01  -  01 Nov 2019 07:00  --------------------------------------------------------  IN: 100 mL / OUT: 600 mL / NET: -500 mL        PHYSICAL EXAM:  GENERAL: NAD, well-developed  HEAD:  Atraumatic, Normocephalic  EYES: EOMI, conjunctiva and sclera clear  ENMT: Moist mucous membranes  NECK: Supple  CHEST/LUNG: Fine crackles at base bilaterally. No rales, rhonchi, wheezing, or rubs  HEART: Regular rate and rhythm; No murmurs, rubs, or gallops  ABDOMEN: Soft, Nontender, Nondistended; Bowel sounds present  EXTREMITIES:  2+ radial pulses. No clubbing, cyanosis, or edema. Unable to appreciate pedal pulses due to induration of skin. Venous stasis induration bilaterally. 1+ edema to midcalf bilaterally improved from yesterday.  LYMPH: No lymphadenopathy noted  NERVOUS SYSTEM:  Alert & Oriented X3, Good concentration; Motor Strength 5/5 B/L upper and lower extremities            LABS:                        14.9   8.38  )-----------( 250      ( 01 Nov 2019 06:30 )             45.8     11-01    140  |  103  |  20  ----------------------------<  133<H>  4.6   |  25  |  1.31<H>    Ca    9.2      01 Nov 2019 06:30  Phos  2.9     11-01  Mg     1.9     11-01    TPro  7.5  /  Alb  3.8  /  TBili  0.5  /  DBili  x   /  AST  17  /  ALT  18  /  AlkPhos  83  10-30              RADIOLOGY & ADDITIONAL TESTS:    Rudy Adams, PGY-1; [facility] Pager: [phone]; [facility] Pager: [phone].

## 2019-11-01 NOTE — DISCHARGE NOTE PROVIDER - NSDCCPCAREPLAN_GEN_ALL_CORE_FT
PRINCIPAL DISCHARGE DIAGNOSIS  Diagnosis: Cellulitis  Assessment and Plan of Treatment: You have an infection of the skin on the right foot that required treatment with antibiotics. You were treated with topical moisturizers for comfort. Please continue to take _______ for ________ duration. Please follow up with your primary care physician within one week of discharge. PRINCIPAL DISCHARGE DIAGNOSIS  Diagnosis: Cellulitis  Assessment and Plan of Treatment: You have an infection of the skin on the right foot that required treatment with antibiotics. You were treated with topical moisturizers for comfort. Please continue to take clindamycin for two more days duration, with the last doses on November 5th. Please follow up with your primary care physician within one week of discharge.

## 2019-11-01 NOTE — PROGRESS NOTE ADULT - PROBLEM SELECTOR PLAN 1
- Likely 2/2 chronic venous skin changes and bacterial infection.   - Clinda 600 IV q8h (10/30-)  - Transition to PO today  - Trend fever and leukocytosis curves. If worsening sx, will send BCx.  - Wound care consult

## 2019-11-01 NOTE — DISCHARGE NOTE PROVIDER - CARE PROVIDER_API CALL
Maximilian Hammer)  Internal Medicine  [address] [address] [place] [address]  Phone: [phone]  Fax: [phone]  Follow Up Time: 1 week

## 2019-11-01 NOTE — PROGRESS NOTE ADULT - PROBLEM SELECTOR PLAN 3
- Trend Cr level.  - c/w gentle IV hydration overnight given clinical exam findings and mildly elevated lactate.

## 2019-11-01 NOTE — DISCHARGE NOTE PROVIDER - HOSPITAL COURSE
HPI: 87 y.o male with PMHx of DM who presents to ED with daughter c/o RLE swelling x 1 week and oozing of skin x 3 days.  Pt recently in [facility] x 1 month, returned last night. Notes that for approx. 1 week he has had RLE swelling from calf to foot. Then 3 days ago he noted flaking of skin with oozing, Pt notes pain making it difficult to ambulate. Pt previously had sx to Rt ankle 4 yrs ago, states rods were placed but then removed 1 year later. Pt denies fevers, chills, cp, sob, pleuritic pain, numbness or tingling, weakness or any other complaints.        ED: In the ED, noted VSSAF. Given clinda for cellulitis diagnosis. DVT study negative.        Hospital course: Patient was continued on clindamycin for _____ days. Patient was seen by PT who recommended _______ HPI: 87 y.o male with PMHx of DM who presents to ED with daughter c/o RLE swelling x 1 week and oozing of skin x 3 days.  Pt recently in [facility] x 1 month, returned last night. Notes that for approx. 1 week he has had RLE swelling from calf to foot. Then 3 days ago he noted flaking of skin with oozing, Pt notes pain making it difficult to ambulate. Pt previously had sx to Rt ankle 4 yrs ago, states rods were placed but then removed 1 year later. Pt denies fevers, chills, cp, sob, pleuritic pain, numbness or tingling, weakness or any other complaints.        ED: In the ED, noted VSSAF. Given clinda for cellulitis diagnosis. DVT study negative.        Hospital course: Patient was started on clindamycin on 10/30 and switched to oral on 11/3. Patient was seen by PT who recommended agreed with home PT services. HPI: 87 y.o male with PMHx of DM who presents to ED with daughter c/o RLE swelling x 1 week and oozing of skin x 3 days.  Pt recently in [facility] x 1 month, returned last night. Notes that for approx. 1 week he has had RLE swelling from calf to foot. Then 3 days ago he noted flaking of skin with oozing, Pt notes pain making it difficult to ambulate. Pt previously had sx to Rt ankle 4 yrs ago, states rods were placed but then removed 1 year later. Pt denies fevers, chills, cp, sob, pleuritic pain, numbness or tingling, weakness or any other complaints.        ED: In the ED, noted VSSAF. Given clinda for cellulitis diagnosis. DVT study negative.        Hospital course: Patient was started on clindamycin on 10/30 and switched to oral on 11/3. Patient was seen by PT who recommended agreed with home PT services. HgbA1c was 6.9 on admission. Patient sugars were well controlled on ISS alone.

## 2019-11-02 DIAGNOSIS — R63.8 OTHER SYMPTOMS AND SIGNS CONCERNING FOOD AND FLUID INTAKE: ICD-10-CM

## 2019-11-02 LAB
ANION GAP SERPL CALC-SCNC: 13 MMO/L — SIGNIFICANT CHANGE UP (ref 7–14)
BUN SERPL-MCNC: 24 MG/DL — HIGH (ref 7–23)
CALCIUM SERPL-MCNC: 9.3 MG/DL — SIGNIFICANT CHANGE UP (ref 8.4–10.5)
CHLORIDE SERPL-SCNC: 104 MMOL/L — SIGNIFICANT CHANGE UP (ref 98–107)
CO2 SERPL-SCNC: 22 MMOL/L — SIGNIFICANT CHANGE UP (ref 22–31)
CREAT SERPL-MCNC: 1.36 MG/DL — HIGH (ref 0.5–1.3)
GLUCOSE BLDC GLUCOMTR-MCNC: 120 MG/DL — HIGH (ref 70–99)
GLUCOSE BLDC GLUCOMTR-MCNC: 148 MG/DL — HIGH (ref 70–99)
GLUCOSE BLDC GLUCOMTR-MCNC: 153 MG/DL — HIGH (ref 70–99)
GLUCOSE BLDC GLUCOMTR-MCNC: 86 MG/DL — SIGNIFICANT CHANGE UP (ref 70–99)
GLUCOSE SERPL-MCNC: 132 MG/DL — HIGH (ref 70–99)
HCT VFR BLD CALC: 45.3 % — SIGNIFICANT CHANGE UP (ref 39–50)
HGB BLD-MCNC: 14.4 G/DL — SIGNIFICANT CHANGE UP (ref 13–17)
MAGNESIUM SERPL-MCNC: 1.8 MG/DL — SIGNIFICANT CHANGE UP (ref 1.6–2.6)
MCHC RBC-ENTMCNC: 31.8 % — LOW (ref 32–36)
MCHC RBC-ENTMCNC: 31.9 PG — SIGNIFICANT CHANGE UP (ref 27–34)
MCV RBC AUTO: 100.2 FL — HIGH (ref 80–100)
NRBC # FLD: 0 K/UL — SIGNIFICANT CHANGE UP (ref 0–0)
PHOSPHATE SERPL-MCNC: 3.5 MG/DL — SIGNIFICANT CHANGE UP (ref 2.5–4.5)
PLATELET # BLD AUTO: 228 K/UL — SIGNIFICANT CHANGE UP (ref 150–400)
PMV BLD: 9.5 FL — SIGNIFICANT CHANGE UP (ref 7–13)
POTASSIUM SERPL-MCNC: 4.7 MMOL/L — SIGNIFICANT CHANGE UP (ref 3.5–5.3)
POTASSIUM SERPL-SCNC: 4.7 MMOL/L — SIGNIFICANT CHANGE UP (ref 3.5–5.3)
RBC # BLD: 4.52 M/UL — SIGNIFICANT CHANGE UP (ref 4.2–5.8)
RBC # FLD: 12.3 % — SIGNIFICANT CHANGE UP (ref 10.3–14.5)
SODIUM SERPL-SCNC: 139 MMOL/L — SIGNIFICANT CHANGE UP (ref 135–145)
WBC # BLD: 7.58 K/UL — SIGNIFICANT CHANGE UP (ref 3.8–10.5)
WBC # FLD AUTO: 7.58 K/UL — SIGNIFICANT CHANGE UP (ref 3.8–10.5)

## 2019-11-02 PROCEDURE — 99232 SBSQ HOSP IP/OBS MODERATE 35: CPT | Mod: GC

## 2019-11-02 RX ADMIN — Medication 100 MILLIGRAM(S): at 09:46

## 2019-11-02 RX ADMIN — Medication 100 MILLIGRAM(S): at 17:43

## 2019-11-02 RX ADMIN — HEPARIN SODIUM 5000 UNIT(S): 5000 INJECTION INTRAVENOUS; SUBCUTANEOUS at 17:43

## 2019-11-02 RX ADMIN — Medication 1: at 12:52

## 2019-11-02 RX ADMIN — HEPARIN SODIUM 5000 UNIT(S): 5000 INJECTION INTRAVENOUS; SUBCUTANEOUS at 22:35

## 2019-11-02 RX ADMIN — Medication 100 MILLIGRAM(S): at 00:44

## 2019-11-02 RX ADMIN — Medication 1 TABLET(S): at 12:52

## 2019-11-02 RX ADMIN — HEPARIN SODIUM 5000 UNIT(S): 5000 INJECTION INTRAVENOUS; SUBCUTANEOUS at 05:50

## 2019-11-02 RX ADMIN — Medication 81 MILLIGRAM(S): at 12:52

## 2019-11-02 NOTE — CHART NOTE - NSCHARTNOTEFT_GEN_A_CORE
Attempted multiple times to reach out to  to set up home PT; unable to reach.  Will try tomorrow to have services set up.

## 2019-11-02 NOTE — PROGRESS NOTE ADULT - PROBLEM SELECTOR PLAN 1
Likely 2/2 chronic venous skin changes and bacterial infection.   - Clinda 600 IV q8h (10/30-)  - Transition to PO today  - Trend fever and leukocytosis curves. If worsening sx, will send BCx.  - Wound care consult, recs noted

## 2019-11-02 NOTE — PROGRESS NOTE ADULT - PROBLEM SELECTOR PLAN 5
- DVT ppx w/ hep sc.  - Dispo: will discuss with patient today  - Consult: PT - home with outpatient PT - DVT ppx w/ hep sc.  - Dispo: will discuss with patient today  - Consult: PT - home with home PT    Transitions of Care Status:  1.  Name of PCP: unknown  2.  PCP Contacted on Admission: [ ] Y    [ x] N    3.  PCP contacted at Discharge: [ ] Y    [ x] N    [ ] N/A  4.  Post-Discharge Appointment Date and Location: n/a  5.  Summary of Handoff given to PCP: none

## 2019-11-02 NOTE — PROGRESS NOTE ADULT - SUBJECTIVE AND OBJECTIVE BOX
Arian Angela MD  Beeper: [phone] ([facility])/ [id number] ([facility])     Subjective:    Patient is a 87y old  Male who presents with a chief complaint of LLE swelling and pain (01 Nov 2019 14:52)    Overnight events:  Pt seen and examined at bedside.  Per night float, no issues over night.  Pt denies acute events over night.  States that his foot still hurts him.  PT was asked to re-evaluate pt for home PT but no further documentation noted which stated that pt can go home with home PT.      MEDICATIONS  (STANDING):  aspirin enteric coated 81 milliGRAM(s) Oral daily  clindamycin IVPB 600 milliGRAM(s) IV Intermittent every 8 hours  dextrose 5%. 1000 milliLiter(s) (50 mL/Hr) IV Continuous <Continuous>  dextrose 50% Injectable 12.5 Gram(s) IV Push once  dextrose 50% Injectable 25 Gram(s) IV Push once  dextrose 50% Injectable 25 Gram(s) IV Push once  heparin  Injectable 5000 Unit(s) SubCutaneous every 8 hours  insulin lispro (HumaLOG) corrective regimen sliding scale   SubCutaneous three times a day before meals  insulin lispro (HumaLOG) corrective regimen sliding scale   SubCutaneous at bedtime  lactobacillus acidophilus 1 Tablet(s) Oral daily    MEDICATIONS  (PRN):  dextrose 40% Gel 15 Gram(s) Oral once PRN Blood Glucose LESS THAN 70 milliGRAM(s)/deciliter  glucagon  Injectable 1 milliGRAM(s) IntraMuscular once PRN Glucose LESS THAN 70 milligrams/deciliter      Objective:    Vitals: Vital Signs Last 24 Hrs  T(C): 36.6 (11-02-19 @ 05:49), Max: 36.7 (11-01-19 @ 14:53)  T(F): 97.8 (11-02-19 @ 05:49), Max: 98.1 (11-01-19 @ 14:53)  HR: 67 (11-02-19 @ 05:49) (67 - 83)  BP: 117/62 (11-02-19 @ 05:49) (117/62 - 125/72)  RR: 18 (11-02-19 @ 05:49) (18 - 20)  SpO2: 100% (11-02-19 @ 05:49) (99% - 100%)              I&O's Summary      PHYSICAL EXAM:  GENERAL: NAD, well-developed  HEAD:  Atraumatic, Normocephalic  EYES: EOMI, conjunctiva and sclera clear  ENMT: Moist mucous membranes  NECK: Supple  CHEST/LUNG: Fine crackles at base bilaterally. No rales, rhonchi, wheezing, or rubs  HEART: Regular rate and rhythm; No murmurs, rubs, or gallops  ABDOMEN: Soft, Nontender, Nondistended; Bowel sounds present  EXTREMITIES:  2+ radial pulses. No clubbing, cyanosis, or edema. Unable to appreciate pedal pulses due to induration of skin. Venous stasis induration bilaterally. 1+ edema to midcalf bilaterally improved from yesterday.  LYMPH: No lymphadenopathy noted  NERVOUS SYSTEM:  Alert & Oriented X3, Good concentration; Motor Strength 5/5 B/L upper and lower extremities                                            LABS:  11-01    140  |  103  |  20  ----------------------------<  133<H>  4.6   |  25  |  1.31<H>  10-30    142  |  102  |  23  ----------------------------<  192<H>  4.3   |  27  |  1.55<H>  10-30    142  |  103  |  23  ----------------------------<  121<H>  4.6   |  26  |  1.55<H>    Ca    9.2      01 Nov 2019 06:30  Ca    9.4      30 Oct 2019 21:29  Ca    9.4      30 Oct 2019 20:51  Phos  2.9     11-01  Mg     1.9     11-01    TPro  7.5  /  Alb  3.8  /  TBili  0.5  /  DBili  x   /  AST  17  /  ALT  18  /  AlkPhos  83  10-30  TPro  8.5<H>  /  Alb  4.1  /  TBili  0.5  /  DBili  x   /  AST  36  /  ALT  23  /  AlkPhos  93  10-30               14.4   7.58  )-----------( 228      ( 02 Nov 2019 06:15 )             45.3              CAPILLARY BLOOD GLUCOSE  POCT Blood Glucose.: 170 mg/dL (01 Nov 2019 22:07)  POCT Blood Glucose.: 86 mg/dL (01 Nov 2019 18:06)  POCT Blood Glucose.: 96 mg/dL (01 Nov 2019 12:52)  POCT Blood Glucose.: 106 mg/dL (01 Nov 2019 09:07) Arian Angela MD  Beeper: [phone] ([facility])/ [id number] ([facility])     Subjective:    Patient is a 87y old  Male who presents with a chief complaint of LLE swelling and pain (01 Nov 2019 14:52)    Overnight events:  Pt seen and examined at bedside.  Per night float, no issues over night.  Pt denies acute events over night.  States that his foot still hurts him.  PT was asked to re-evaluate pt for home PT but no further documentation noted which stated that pt can go home with home PT.      MEDICATIONS  (STANDING):  aspirin enteric coated 81 milliGRAM(s) Oral daily  clindamycin IVPB 600 milliGRAM(s) IV Intermittent every 8 hours  dextrose 5%. 1000 milliLiter(s) (50 mL/Hr) IV Continuous <Continuous>  dextrose 50% Injectable 12.5 Gram(s) IV Push once  dextrose 50% Injectable 25 Gram(s) IV Push once  dextrose 50% Injectable 25 Gram(s) IV Push once  heparin  Injectable 5000 Unit(s) SubCutaneous every 8 hours  insulin lispro (HumaLOG) corrective regimen sliding scale   SubCutaneous three times a day before meals  insulin lispro (HumaLOG) corrective regimen sliding scale   SubCutaneous at bedtime  lactobacillus acidophilus 1 Tablet(s) Oral daily    MEDICATIONS  (PRN):  dextrose 40% Gel 15 Gram(s) Oral once PRN Blood Glucose LESS THAN 70 milliGRAM(s)/deciliter  glucagon  Injectable 1 milliGRAM(s) IntraMuscular once PRN Glucose LESS THAN 70 milligrams/deciliter      Objective:    Vitals: Vital Signs Last 24 Hrs  T(C): 36.6 (11-02-19 @ 05:49), Max: 36.7 (11-01-19 @ 14:53)  T(F): 97.8 (11-02-19 @ 05:49), Max: 98.1 (11-01-19 @ 14:53)  HR: 67 (11-02-19 @ 05:49) (67 - 83)  BP: 117/62 (11-02-19 @ 05:49) (117/62 - 125/72)  RR: 18 (11-02-19 @ 05:49) (18 - 20)  SpO2: 100% (11-02-19 @ 05:49) (99% - 100%)              I&O's Summary      PHYSICAL EXAM:  GENERAL: NAD, well-developed  HEAD:  Atraumatic, Normocephalic  EYES: EOMI, conjunctiva and sclera clear  ENMT: Moist mucous membranes  NECK: Supple  CHEST/LUNG: Fine crackles at base bilaterally. No rales, rhonchi, wheezing, or rubs  HEART: Regular rate and rhythm; No murmurs, rubs, or gallops  ABDOMEN: Soft, Nontender, Nondistended; Bowel sounds present  EXTREMITIES:  2+ radial pulses. No clubbing, cyanosis, or edema. Unable to appreciate pedal pulses due to induration of skin. Venous stasis induration bilaterally. 1+ edema to midcalf bilaterally improved from yesterday.  LYMPH: No lymphadenopathy noted  NERVOUS SYSTEM:  Alert & Oriented X3, Good concentration; Motor Strength 5/5 B/L upper and lower extremities                                            LABS:  11-02    139  |  104  |  24<H>  ----------------------------<  132<H>  4.7   |  22  |  1.36<H>    Ca    9.3      02 Nov 2019 06:15  Mg     1.8     11-02               14.4   7.58  )-----------( 228      ( 02 Nov 2019 06:15 )             45.3                    CAPILLARY BLOOD GLUCOSE      POCT Blood Glucose.: 120 mg/dL (02 Nov 2019 08:59)  POCT Blood Glucose.: 170 mg/dL (01 Nov 2019 22:07)  POCT Blood Glucose.: 86 mg/dL (01 Nov 2019 18:06)  POCT Blood Glucose.: 96 mg/dL (01 Nov 2019 12:52)

## 2019-11-03 LAB
ANION GAP SERPL CALC-SCNC: 11 MMO/L — SIGNIFICANT CHANGE UP (ref 7–14)
BUN SERPL-MCNC: 27 MG/DL — HIGH (ref 7–23)
CALCIUM SERPL-MCNC: 9.5 MG/DL — SIGNIFICANT CHANGE UP (ref 8.4–10.5)
CHLORIDE SERPL-SCNC: 99 MMOL/L — SIGNIFICANT CHANGE UP (ref 98–107)
CO2 SERPL-SCNC: 23 MMOL/L — SIGNIFICANT CHANGE UP (ref 22–31)
CREAT SERPL-MCNC: 1.42 MG/DL — HIGH (ref 0.5–1.3)
GLUCOSE BLDC GLUCOMTR-MCNC: 112 MG/DL — HIGH (ref 70–99)
GLUCOSE BLDC GLUCOMTR-MCNC: 127 MG/DL — HIGH (ref 70–99)
GLUCOSE BLDC GLUCOMTR-MCNC: 156 MG/DL — HIGH (ref 70–99)
GLUCOSE BLDC GLUCOMTR-MCNC: 96 MG/DL — SIGNIFICANT CHANGE UP (ref 70–99)
GLUCOSE SERPL-MCNC: 135 MG/DL — HIGH (ref 70–99)
HCT VFR BLD CALC: 44.9 % — SIGNIFICANT CHANGE UP (ref 39–50)
HGB BLD-MCNC: 14.4 G/DL — SIGNIFICANT CHANGE UP (ref 13–17)
MAGNESIUM SERPL-MCNC: 1.8 MG/DL — SIGNIFICANT CHANGE UP (ref 1.6–2.6)
MCHC RBC-ENTMCNC: 31.2 PG — SIGNIFICANT CHANGE UP (ref 27–34)
MCHC RBC-ENTMCNC: 32.1 % — SIGNIFICANT CHANGE UP (ref 32–36)
MCV RBC AUTO: 97.4 FL — SIGNIFICANT CHANGE UP (ref 80–100)
NRBC # FLD: 0 K/UL — SIGNIFICANT CHANGE UP (ref 0–0)
PHOSPHATE SERPL-MCNC: 3.7 MG/DL — SIGNIFICANT CHANGE UP (ref 2.5–4.5)
PLATELET # BLD AUTO: 264 K/UL — SIGNIFICANT CHANGE UP (ref 150–400)
PMV BLD: 9.5 FL — SIGNIFICANT CHANGE UP (ref 7–13)
POTASSIUM SERPL-MCNC: 4.3 MMOL/L — SIGNIFICANT CHANGE UP (ref 3.5–5.3)
POTASSIUM SERPL-SCNC: 4.3 MMOL/L — SIGNIFICANT CHANGE UP (ref 3.5–5.3)
RBC # BLD: 4.61 M/UL — SIGNIFICANT CHANGE UP (ref 4.2–5.8)
RBC # FLD: 12.3 % — SIGNIFICANT CHANGE UP (ref 10.3–14.5)
SODIUM SERPL-SCNC: 133 MMOL/L — LOW (ref 135–145)
WBC # BLD: 8.03 K/UL — SIGNIFICANT CHANGE UP (ref 3.8–10.5)
WBC # FLD AUTO: 8.03 K/UL — SIGNIFICANT CHANGE UP (ref 3.8–10.5)

## 2019-11-03 PROCEDURE — 99233 SBSQ HOSP IP/OBS HIGH 50: CPT | Mod: GC

## 2019-11-03 RX ORDER — SENNA PLUS 8.6 MG/1
2 TABLET ORAL AT BEDTIME
Refills: 0 | Status: DISCONTINUED | OUTPATIENT
Start: 2019-11-03 | End: 2019-11-04

## 2019-11-03 RX ORDER — POLYETHYLENE GLYCOL 3350 17 G/17G
17 POWDER, FOR SOLUTION ORAL DAILY
Refills: 0 | Status: DISCONTINUED | OUTPATIENT
Start: 2019-11-03 | End: 2019-11-04

## 2019-11-03 RX ADMIN — Medication 81 MILLIGRAM(S): at 11:30

## 2019-11-03 RX ADMIN — HEPARIN SODIUM 5000 UNIT(S): 5000 INJECTION INTRAVENOUS; SUBCUTANEOUS at 21:56

## 2019-11-03 RX ADMIN — Medication 300 MILLIGRAM(S): at 18:21

## 2019-11-03 RX ADMIN — Medication 1 TABLET(S): at 11:30

## 2019-11-03 RX ADMIN — Medication 100 MILLIGRAM(S): at 00:41

## 2019-11-03 RX ADMIN — POLYETHYLENE GLYCOL 3350 17 GRAM(S): 17 POWDER, FOR SOLUTION ORAL at 11:30

## 2019-11-03 RX ADMIN — HEPARIN SODIUM 5000 UNIT(S): 5000 INJECTION INTRAVENOUS; SUBCUTANEOUS at 05:57

## 2019-11-03 RX ADMIN — Medication 300 MILLIGRAM(S): at 11:46

## 2019-11-03 RX ADMIN — SENNA PLUS 2 TABLET(S): 8.6 TABLET ORAL at 21:57

## 2019-11-03 NOTE — PROGRESS NOTE ADULT - PROBLEM SELECTOR PLAN 5
- DVT ppx w/ hep sc.  - Dispo: will discuss with patient today  - Consult: PT - home with home PT    Transitions of Care Status:  1.  Name of PCP: unknown  2.  PCP Contacted on Admission: [ ] Y    [ x] N    3.  PCP contacted at Discharge: [ ] Y    [ x] N    [ ] N/A  4.  Post-Discharge Appointment Date and Location: n/a  5.  Summary of Handoff given to PCP: none

## 2019-11-03 NOTE — PROGRESS NOTE ADULT - SUBJECTIVE AND OBJECTIVE BOX
PROGRESS NOTE:   Authoted by Dr. Rudy Adams MD, Pager [phone] [facility], [id number] [facility]     Patient is a 87y old  Male who presents with a chief complaint of LLE swelling and pain (03 Nov 2019 06:28)      SUBJECTIVE / OVERNIGHT EVENTS: No acute events overnight. Patient seen and examined at bedside. Still has pain when walking.     ADDITIONAL REVIEW OF SYSTEMS: No n/v/f/c.    MEDICATIONS  (STANDING):  aspirin enteric coated 81 milliGRAM(s) Oral daily  clindamycin IVPB 600 milliGRAM(s) IV Intermittent every 8 hours  dextrose 5%. 1000 milliLiter(s) (50 mL/Hr) IV Continuous <Continuous>  dextrose 50% Injectable 12.5 Gram(s) IV Push once  dextrose 50% Injectable 25 Gram(s) IV Push once  dextrose 50% Injectable 25 Gram(s) IV Push once  heparin  Injectable 5000 Unit(s) SubCutaneous every 8 hours  insulin lispro (HumaLOG) corrective regimen sliding scale   SubCutaneous three times a day before meals  insulin lispro (HumaLOG) corrective regimen sliding scale   SubCutaneous at bedtime  lactobacillus acidophilus 1 Tablet(s) Oral daily    MEDICATIONS  (PRN):  dextrose 40% Gel 15 Gram(s) Oral once PRN Blood Glucose LESS THAN 70 milliGRAM(s)/deciliter  glucagon  Injectable 1 milliGRAM(s) IntraMuscular once PRN Glucose LESS THAN 70 milligrams/deciliter      CAPILLARY BLOOD GLUCOSE      POCT Blood Glucose.: 148 mg/dL (02 Nov 2019 22:04)  POCT Blood Glucose.: 86 mg/dL (02 Nov 2019 17:58)  POCT Blood Glucose.: 153 mg/dL (02 Nov 2019 12:30)  POCT Blood Glucose.: 120 mg/dL (02 Nov 2019 08:59)    I&O's Summary      PHYSICAL EXAM:  Vital Signs Last 24 Hrs  T(C): 36.7 (03 Nov 2019 05:32), Max: 36.8 (02 Nov 2019 14:37)  T(F): 98 (03 Nov 2019 05:32), Max: 98.2 (02 Nov 2019 14:37)  HR: 70 (03 Nov 2019 05:32) (67 - 76)  BP: 114/56 (03 Nov 2019 05:32) (108/53 - 120/58)  BP(mean): --  RR: 18 (03 Nov 2019 05:32) (17 - 19)  SpO2: 99% (03 Nov 2019 05:32) (97% - 99%)    GENERAL: NAD, well-developed  HEAD:  Atraumatic, Normocephalic  EYES: EOMI, conjunctiva and sclera clear  ENMT: Moist mucous membranes  NECK: Supple  CHEST/LUNG: CTAB. No rales, rhonchi, wheezing, or rubs  HEART: Regular rate and rhythm; No murmurs, rubs, or gallops  ABDOMEN: Soft, Nontender, Nondistended; Bowel sounds present  EXTREMITIES:  2+ radial pulses. No clubbing, cyanosis, or edema. Unable to appreciate pedal pulses due to induration of skin. Venous stasis induration bilaterally. Trace edema to midcalf bilaterally.  NERVOUS SYSTEM:  Grossly normal    LABS:                        14.4   8.03  )-----------( 264      ( 03 Nov 2019 05:30 )             44.9     11-03    133<L>  |  99  |  27<H>  ----------------------------<  135<H>  4.3   |  23  |  1.42<H>    Ca    9.5      03 Nov 2019 05:30  Phos  3.7     11-03  Mg     1.8     11-03                  RADIOLOGY & ADDITIONAL TESTS:  Results Reviewed:     COORDINATION OF CARE:  Care Discussed with Consultants/Other Providers [Y/N]: N  Prior or Outpatient Records Reviewed [Y/N]: KHALIF

## 2019-11-04 VITALS
TEMPERATURE: 98 F | SYSTOLIC BLOOD PRESSURE: 115 MMHG | HEART RATE: 70 BPM | DIASTOLIC BLOOD PRESSURE: 62 MMHG | OXYGEN SATURATION: 100 % | RESPIRATION RATE: 18 BRPM

## 2019-11-04 LAB
GLUCOSE BLDC GLUCOMTR-MCNC: 126 MG/DL — HIGH (ref 70–99)
GLUCOSE BLDC GLUCOMTR-MCNC: 126 MG/DL — HIGH (ref 70–99)

## 2019-11-04 PROCEDURE — 99239 HOSP IP/OBS DSCHRG MGMT >30: CPT | Mod: GC

## 2019-11-04 RX ORDER — LACTOBACILLUS ACIDOPHILUS 100MM CELL
2 CAPSULE ORAL
Qty: 28 | Refills: 0
Start: 2019-11-04 | End: 2019-11-17

## 2019-11-04 RX ADMIN — Medication 1 TABLET(S): at 12:30

## 2019-11-04 RX ADMIN — Medication 300 MILLIGRAM(S): at 12:30

## 2019-11-04 RX ADMIN — Medication 300 MILLIGRAM(S): at 01:51

## 2019-11-04 RX ADMIN — Medication 300 MILLIGRAM(S): at 06:04

## 2019-11-04 RX ADMIN — HEPARIN SODIUM 5000 UNIT(S): 5000 INJECTION INTRAVENOUS; SUBCUTANEOUS at 06:04

## 2019-11-04 RX ADMIN — Medication 81 MILLIGRAM(S): at 12:30

## 2019-11-04 NOTE — PROGRESS NOTE ADULT - PROBLEM SELECTOR PLAN 5
- DVT ppx w/ hep sc.  - Dispo: will discuss with patient today  - Consult: PT - home with PT, home or outpatient    Transitions of Care Status:  1.  Name of PCP: unknown  2.  PCP Contacted on Admission: [ ] Y    [ x] N    3.  PCP contacted at Discharge: [ ] Y    [ x] N    [ ] N/A  4.  Post-Discharge Appointment Date and Location: n/a  5.  Summary of Handoff given to PCP: none

## 2019-11-04 NOTE — PROGRESS NOTE ADULT - PROBLEM SELECTOR PLAN 1
Likely 2/2 chronic venous skin changes and bacterial infection.   - Clinda 300 PO QID (10/30-)  - Trend fever and leukocytosis curves. If worsening sx, will send BCx.  - Wound care consult, recs noted

## 2019-11-04 NOTE — PROGRESS NOTE ADULT - SUBJECTIVE AND OBJECTIVE BOX
PROGRESS NOTE:   Authoted by Dr. Rudy Adams MD, Pager [phone] [facility], [id number] [facility]     Patient is a 87y old  Male who presents with a chief complaint of LLE swelling and pain (04 Nov 2019 06:31)      SUBJECTIVE / OVERNIGHT EVENTS: No acute events overnight. Patient seen and examined at bedside. Pain on walking is improved today.     ADDITIONAL REVIEW OF SYSTEMS: Denies f/c/n/v.    MEDICATIONS  (STANDING):  aspirin enteric coated 81 milliGRAM(s) Oral daily  clindamycin   Capsule 300 milliGRAM(s) Oral four times a day  dextrose 5%. 1000 milliLiter(s) (50 mL/Hr) IV Continuous <Continuous>  dextrose 50% Injectable 12.5 Gram(s) IV Push once  dextrose 50% Injectable 25 Gram(s) IV Push once  dextrose 50% Injectable 25 Gram(s) IV Push once  heparin  Injectable 5000 Unit(s) SubCutaneous every 8 hours  insulin lispro (HumaLOG) corrective regimen sliding scale   SubCutaneous three times a day before meals  insulin lispro (HumaLOG) corrective regimen sliding scale   SubCutaneous at bedtime  lactobacillus acidophilus 1 Tablet(s) Oral daily  polyethylene glycol 3350 17 Gram(s) Oral daily  senna 2 Tablet(s) Oral at bedtime    MEDICATIONS  (PRN):  dextrose 40% Gel 15 Gram(s) Oral once PRN Blood Glucose LESS THAN 70 milliGRAM(s)/deciliter  glucagon  Injectable 1 milliGRAM(s) IntraMuscular once PRN Glucose LESS THAN 70 milligrams/deciliter      CAPILLARY BLOOD GLUCOSE      POCT Blood Glucose.: 156 mg/dL (03 Nov 2019 22:05)  POCT Blood Glucose.: 96 mg/dL (03 Nov 2019 18:04)  POCT Blood Glucose.: 127 mg/dL (03 Nov 2019 12:59)  POCT Blood Glucose.: 112 mg/dL (03 Nov 2019 08:48)    I&O's Summary    03 Nov 2019 07:01  -  04 Nov 2019 07:00  --------------------------------------------------------  IN: 350 mL / OUT: 1550 mL / NET: -1200 mL        PHYSICAL EXAM:  Vital Signs Last 24 Hrs  T(C): 36.8 (04 Nov 2019 05:31), Max: 36.8 (03 Nov 2019 14:59)  T(F): 98.3 (04 Nov 2019 05:31), Max: 98.3 (04 Nov 2019 05:31)  HR: 70 (04 Nov 2019 05:31) (67 - 73)  BP: 115/62 (04 Nov 2019 05:31) (102/68 - 146/68)  BP(mean): --  RR: 18 (04 Nov 2019 05:31) (17 - 18)  SpO2: 100% (04 Nov 2019 05:31) (99% - 100%)    GENERAL: NAD, well-developed  HEAD:  Atraumatic, Normocephalic  EYES: EOMI, conjunctiva and sclera clear  ENMT: Moist mucous membranes  NECK: Supple  CHEST/LUNG: CTAB. No rales, rhonchi, wheezing, or rubs  HEART: Regular rate and rhythm; No murmurs, rubs, or gallops  ABDOMEN: Soft, Nontender, Nondistended; Bowel sounds present  EXTREMITIES:  No clubbing, cyanosis, or edema. Warm and well perfused. Unable to appreciate pedal pulses due to induration of skin. Venous stasis induration bilaterally. Trace edema to midcalf bilaterally. Right lower extremity dry, flaking on touch.  NERVOUS SYSTEM:  Grossly normal    LABS:                        14.4   8.03  )-----------( 264      ( 03 Nov 2019 05:30 )             44.9     11-03    133<L>  |  99  |  27<H>  ----------------------------<  135<H>  4.3   |  23  |  1.42<H>    Ca    9.5      03 Nov 2019 05:30  Phos  3.7     11-03  Mg     1.8     11-03                  RADIOLOGY & ADDITIONAL TESTS:  Results Reviewed: N/A  Imaging Personally Reviewed: N/A    COORDINATION OF CARE:  Care Discussed with Consultants/Other Providers [Y/N]: N  Prior or Outpatient Records Reviewed [Y/N]: N

## 2019-11-04 NOTE — PROGRESS NOTE ADULT - REASON FOR ADMISSION
LLE swelling and pain

## 2019-11-04 NOTE — PROGRESS NOTE ADULT - ATTENDING COMMENTS
Pt feels well, afebrile, non-toxic appearing, with b/l LE pitting edema R>L, with leukocytosis concerning for cellulitis. Continue empiric clindamycin with probiotic.
RLE less edematous today. Pt ambulating with PT, still reporting some unsteadiness. Will f/u PT recs and anticipate d/c home in next 24 hours. Discussed with pt and family who are in agreement.
Leg pain improving. D/c home today with home PT and Keflex to finish 7 days of abx.    40 minutes spent on discharge planning
RLE less swollen, non-tender. Pt able to ambulate some with cane. Pt desires home with PT; await PT re-evaluation. Time planning discharge 35 minutes.
Pt reports improvement in RLE edema. No pain.   PT rec home with home PT. dc planning. Time 40 min

## 2020-08-26 ENCOUNTER — INPATIENT (INPATIENT)
Facility: HOSPITAL | Age: 85
LOS: 1 days | Discharge: HOME | End: 2020-08-28
Attending: HOSPITALIST | Admitting: HOSPITALIST
Payer: MEDICARE

## 2020-08-26 VITALS
DIASTOLIC BLOOD PRESSURE: 94 MMHG | WEIGHT: 199.96 LBS | RESPIRATION RATE: 17 BRPM | TEMPERATURE: 98 F | HEIGHT: 66 IN | SYSTOLIC BLOOD PRESSURE: 153 MMHG | HEART RATE: 74 BPM | OXYGEN SATURATION: 96 %

## 2020-08-26 DIAGNOSIS — Z87.81 PERSONAL HISTORY OF (HEALED) TRAUMATIC FRACTURE: Chronic | ICD-10-CM

## 2020-08-26 DIAGNOSIS — E11.9 TYPE 2 DIABETES MELLITUS WITHOUT COMPLICATIONS: ICD-10-CM

## 2020-08-26 DIAGNOSIS — R26.2 DIFFICULTY IN WALKING, NOT ELSEWHERE CLASSIFIED: ICD-10-CM

## 2020-08-26 DIAGNOSIS — M54.9 DORSALGIA, UNSPECIFIED: ICD-10-CM

## 2020-08-26 LAB
ALBUMIN SERPL ELPH-MCNC: 3.3 G/DL — SIGNIFICANT CHANGE UP (ref 3.3–5)
ALP SERPL-CCNC: 83 U/L — SIGNIFICANT CHANGE UP (ref 40–120)
ALT FLD-CCNC: 30 U/L — SIGNIFICANT CHANGE UP (ref 12–78)
ANION GAP SERPL CALC-SCNC: 5 MMOL/L — SIGNIFICANT CHANGE UP (ref 5–17)
AST SERPL-CCNC: 17 U/L — SIGNIFICANT CHANGE UP (ref 15–37)
BASOPHILS # BLD AUTO: 0.01 K/UL — SIGNIFICANT CHANGE UP (ref 0–0.2)
BASOPHILS NFR BLD AUTO: 0.1 % — SIGNIFICANT CHANGE UP (ref 0–2)
BILIRUB SERPL-MCNC: 0.3 MG/DL — SIGNIFICANT CHANGE UP (ref 0.2–1.2)
BUN SERPL-MCNC: 13 MG/DL — SIGNIFICANT CHANGE UP (ref 7–23)
CALCIUM SERPL-MCNC: 9.2 MG/DL — SIGNIFICANT CHANGE UP (ref 8.5–10.1)
CHLORIDE SERPL-SCNC: 107 MMOL/L — SIGNIFICANT CHANGE UP (ref 96–108)
CO2 SERPL-SCNC: 31 MMOL/L — SIGNIFICANT CHANGE UP (ref 22–31)
CREAT SERPL-MCNC: 1.4 MG/DL — HIGH (ref 0.5–1.3)
EOSINOPHIL # BLD AUTO: 0.15 K/UL — SIGNIFICANT CHANGE UP (ref 0–0.5)
EOSINOPHIL NFR BLD AUTO: 1.9 % — SIGNIFICANT CHANGE UP (ref 0–6)
GLUCOSE BLDC GLUCOMTR-MCNC: 100 MG/DL — HIGH (ref 70–99)
GLUCOSE BLDC GLUCOMTR-MCNC: 89 MG/DL — SIGNIFICANT CHANGE UP (ref 70–99)
GLUCOSE SERPL-MCNC: 72 MG/DL — SIGNIFICANT CHANGE UP (ref 70–99)
HCT VFR BLD CALC: 41.5 % — SIGNIFICANT CHANGE UP (ref 39–50)
HGB BLD-MCNC: 14.2 G/DL — SIGNIFICANT CHANGE UP (ref 13–17)
IMM GRANULOCYTES NFR BLD AUTO: 0.5 % — SIGNIFICANT CHANGE UP (ref 0–1.5)
LYMPHOCYTES # BLD AUTO: 2.35 K/UL — SIGNIFICANT CHANGE UP (ref 1–3.3)
LYMPHOCYTES # BLD AUTO: 30.1 % — SIGNIFICANT CHANGE UP (ref 13–44)
MCHC RBC-ENTMCNC: 32.1 PG — SIGNIFICANT CHANGE UP (ref 27–34)
MCHC RBC-ENTMCNC: 34.2 GM/DL — SIGNIFICANT CHANGE UP (ref 32–36)
MCV RBC AUTO: 93.9 FL — SIGNIFICANT CHANGE UP (ref 80–100)
MONOCYTES # BLD AUTO: 0.69 K/UL — SIGNIFICANT CHANGE UP (ref 0–0.9)
MONOCYTES NFR BLD AUTO: 8.8 % — SIGNIFICANT CHANGE UP (ref 2–14)
NEUTROPHILS # BLD AUTO: 4.56 K/UL — SIGNIFICANT CHANGE UP (ref 1.8–7.4)
NEUTROPHILS NFR BLD AUTO: 58.6 % — SIGNIFICANT CHANGE UP (ref 43–77)
NRBC # BLD: 0 /100 WBCS — SIGNIFICANT CHANGE UP (ref 0–0)
PLATELET # BLD AUTO: 210 K/UL — SIGNIFICANT CHANGE UP (ref 150–400)
POTASSIUM SERPL-MCNC: 4.1 MMOL/L — SIGNIFICANT CHANGE UP (ref 3.5–5.3)
POTASSIUM SERPL-SCNC: 4.1 MMOL/L — SIGNIFICANT CHANGE UP (ref 3.5–5.3)
PROT SERPL-MCNC: 7.2 GM/DL — SIGNIFICANT CHANGE UP (ref 6–8.3)
RBC # BLD: 4.42 M/UL — SIGNIFICANT CHANGE UP (ref 4.2–5.8)
RBC # FLD: 12.4 % — SIGNIFICANT CHANGE UP (ref 10.3–14.5)
SARS-COV-2 RNA SPEC QL NAA+PROBE: SIGNIFICANT CHANGE UP
SODIUM SERPL-SCNC: 143 MMOL/L — SIGNIFICANT CHANGE UP (ref 135–145)
WBC # BLD: 7.8 K/UL — SIGNIFICANT CHANGE UP (ref 3.8–10.5)
WBC # FLD AUTO: 7.8 K/UL — SIGNIFICANT CHANGE UP (ref 3.8–10.5)

## 2020-08-26 PROCEDURE — 73502 X-RAY EXAM HIP UNI 2-3 VIEWS: CPT | Mod: 26,RT

## 2020-08-26 PROCEDURE — 93010 ELECTROCARDIOGRAM REPORT: CPT | Mod: 76

## 2020-08-26 PROCEDURE — 72100 X-RAY EXAM L-S SPINE 2/3 VWS: CPT | Mod: 26

## 2020-08-26 PROCEDURE — 99223 1ST HOSP IP/OBS HIGH 75: CPT

## 2020-08-26 PROCEDURE — 99285 EMERGENCY DEPT VISIT HI MDM: CPT

## 2020-08-26 RX ORDER — HEPARIN SODIUM 5000 [USP'U]/ML
5000 INJECTION INTRAVENOUS; SUBCUTANEOUS EVERY 8 HOURS
Refills: 0 | Status: DISCONTINUED | OUTPATIENT
Start: 2020-08-26 | End: 2020-08-28

## 2020-08-26 RX ORDER — LANOLIN ALCOHOL/MO/W.PET/CERES
3 CREAM (GRAM) TOPICAL AT BEDTIME
Refills: 0 | Status: DISCONTINUED | OUTPATIENT
Start: 2020-08-26 | End: 2020-08-28

## 2020-08-26 RX ORDER — DEXTROSE 50 % IN WATER 50 %
25 SYRINGE (ML) INTRAVENOUS ONCE
Refills: 0 | Status: DISCONTINUED | OUTPATIENT
Start: 2020-08-26 | End: 2020-08-28

## 2020-08-26 RX ORDER — SODIUM CHLORIDE 9 MG/ML
1000 INJECTION, SOLUTION INTRAVENOUS
Refills: 0 | Status: DISCONTINUED | OUTPATIENT
Start: 2020-08-26 | End: 2020-08-28

## 2020-08-26 RX ORDER — GLUCAGON INJECTION, SOLUTION 0.5 MG/.1ML
1 INJECTION, SOLUTION SUBCUTANEOUS ONCE
Refills: 0 | Status: DISCONTINUED | OUTPATIENT
Start: 2020-08-26 | End: 2020-08-28

## 2020-08-26 RX ORDER — ASPIRIN/CALCIUM CARB/MAGNESIUM 324 MG
1 TABLET ORAL
Qty: 0 | Refills: 0 | DISCHARGE

## 2020-08-26 RX ORDER — LIDOCAINE 4 G/100G
1 CREAM TOPICAL DAILY
Refills: 0 | Status: DISCONTINUED | OUTPATIENT
Start: 2020-08-26 | End: 2020-08-28

## 2020-08-26 RX ORDER — INSULIN LISPRO 100/ML
VIAL (ML) SUBCUTANEOUS
Refills: 0 | Status: DISCONTINUED | OUTPATIENT
Start: 2020-08-26 | End: 2020-08-28

## 2020-08-26 RX ORDER — LIDOCAINE 4 G/100G
1 CREAM TOPICAL ONCE
Refills: 0 | Status: COMPLETED | OUTPATIENT
Start: 2020-08-26 | End: 2020-08-26

## 2020-08-26 RX ORDER — ACETAMINOPHEN 500 MG
975 TABLET ORAL ONCE
Refills: 0 | Status: COMPLETED | OUTPATIENT
Start: 2020-08-26 | End: 2020-08-26

## 2020-08-26 RX ORDER — ACETAMINOPHEN 500 MG
650 TABLET ORAL EVERY 6 HOURS
Refills: 0 | Status: DISCONTINUED | OUTPATIENT
Start: 2020-08-26 | End: 2020-08-28

## 2020-08-26 RX ORDER — INSULIN LISPRO 100/ML
VIAL (ML) SUBCUTANEOUS AT BEDTIME
Refills: 0 | Status: DISCONTINUED | OUTPATIENT
Start: 2020-08-26 | End: 2020-08-28

## 2020-08-26 RX ORDER — DEXTROSE 50 % IN WATER 50 %
15 SYRINGE (ML) INTRAVENOUS ONCE
Refills: 0 | Status: DISCONTINUED | OUTPATIENT
Start: 2020-08-26 | End: 2020-08-28

## 2020-08-26 RX ORDER — OXYCODONE AND ACETAMINOPHEN 5; 325 MG/1; MG/1
1 TABLET ORAL EVERY 6 HOURS
Refills: 0 | Status: DISCONTINUED | OUTPATIENT
Start: 2020-08-26 | End: 2020-08-28

## 2020-08-26 RX ORDER — DEXTROSE 50 % IN WATER 50 %
12.5 SYRINGE (ML) INTRAVENOUS ONCE
Refills: 0 | Status: DISCONTINUED | OUTPATIENT
Start: 2020-08-26 | End: 2020-08-28

## 2020-08-26 RX ADMIN — LIDOCAINE 1 PATCH: 4 CREAM TOPICAL at 19:30

## 2020-08-26 RX ADMIN — Medication 975 MILLIGRAM(S): at 12:52

## 2020-08-26 RX ADMIN — Medication 3 MILLIGRAM(S): at 21:21

## 2020-08-26 RX ADMIN — HEPARIN SODIUM 5000 UNIT(S): 5000 INJECTION INTRAVENOUS; SUBCUTANEOUS at 21:21

## 2020-08-26 RX ADMIN — Medication 975 MILLIGRAM(S): at 13:00

## 2020-08-26 RX ADMIN — LIDOCAINE 1 PATCH: 4 CREAM TOPICAL at 13:15

## 2020-08-26 NOTE — ED ADULT TRIAGE NOTE - CHIEF COMPLAINT QUOTE
chronic LE weakness  states worsens today with unsteady per EMS. Pt states when ambulating legs give out on him

## 2020-08-26 NOTE — H&P ADULT - HISTORY OF PRESENT ILLNESS
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

## 2020-08-26 NOTE — ED PROVIDER NOTE - PHYSICAL EXAMINATION
VITALS: reviewed  GEN: NAD, A & O x 4  HEAD/EYES: NCAT, PERRL, EOMI, anicteric sclerae, no conjunctival pallor  ENT: mucus membranes moist, oropharynx WNL, trachea midline, no JVD  RESP: lungs CTA with equal breath sounds bilaterally, chest wall nontender and atraumatic  CV: heart with reg rhythm S1, S2, distal pulses intact and symmetric bilaterally  ABDOMEN: normoactive bowel sounds, soft, nondistended, nontender, no palpable masses  : no CVAT  MSK: extremities atraumatic and nontender, no edema, no asymmetry. the back is with R lumbar and gluteal lateral tenderness, there is no spinal deformity or stepoff . the neck has no midline tenderness, deformity, or stepoff, and is ranged painlessly.  SKIN: warm, dry, no rash, no bruising, no cyanosis. color appropriate for ethnicity  NEURO: alert, mentating appropriately, no facial asymmetry. gross sensation, motor, coordination are intact. SLR negative.   PSYCH: Affect appropriate

## 2020-08-26 NOTE — ED PROVIDER NOTE - OBJECTIVE STATEMENT
hx DM, presenting with 10/10 back pain and lower extremity pain and weakness. hx DM, presenting with 10/10 R lower back/buttock pain without radiation to lower extremity. Patient endorses chronic pain over past two years, worse in the past 2 weeks, causing him to fall to the ground when he bears weight on R side. Denies hitting head or losing consciousness. Has been taking Tylenol without relief. History of ORIF R ankle with skin graft, numbness at baseline to that area without change. No urinary retention, no bowel incontinence. No fevers/chills. Was given walker "years ago," but does not use.

## 2020-08-26 NOTE — ED ADULT NURSE NOTE - NSIMPLEMENTINTERV_GEN_ALL_ED
Implemented All Fall with Harm Risk Interventions:  [place] to call system. Call bell, personal items and telephone within reach. Instruct patient to call for assistance. Room bathroom lighting operational. Non-slip footwear when patient is off stretcher. Physically safe environment: no spills, clutter or unnecessary equipment. Stretcher in lowest position, wheels locked, appropriate side rails in place. Provide visual cue, wrist band, yellow gown, etc. Monitor gait and stability. Monitor for mental status changes and reorient to person, place, and time. Review medications for side effects contributing to fall risk. Reinforce activity limits and safety measures with patient and family. Provide visual clues: red socks.

## 2020-08-26 NOTE — H&P ADULT - NSHPPHYSICALEXAM_GEN_ALL_CORE
ICU Vital Signs Last 24 Hrs  T(C): 36.7 (26 Aug 2020 16:54), Max: 37.1 (26 Aug 2020 15:36)  T(F): 98.1 (26 Aug 2020 16:54), Max: 98.7 (26 Aug 2020 15:36)  HR: 73 (26 Aug 2020 16:54) (73 - 76)  BP: 159/86 (26 Aug 2020 16:54) (143/93 - 159/86)  BP(mean): --  ABP: --  ABP(mean): --  RR: 18 (26 Aug 2020 16:54) (17 - 19)  SpO2: 98% (26 Aug 2020 16:54) (96% - 98%)

## 2020-08-26 NOTE — ED ADULT NURSE NOTE - OBJECTIVE STATEMENT
Pt received at bed 12 A&Ox3 BIBA. As per EMS, pt has bialteral weakness that has been intermittent for over a year, resulting in unsteady gait. Pt endorses back pain that radiates to the hips. Upon assessment, noted to have dark discoloration on bilateral lower legs. Pt uses cane to get around. Denies any dizziness, headaches, LOC, NV.

## 2020-08-26 NOTE — CONSULT NOTE ADULT - ASSESSMENT
88 year old male DM II w chronic OA came to ED by ambulance c/o increased R hip pain and fall when his leg gave out        #Acute exacerbation of chronic R hip pain  xrays no evidence for fx; likely OA  unable to ambulate due to pain  s/p fall at home: initial encounter  1. admit to medicine  2. tylenol 650 mg prn temp or mild pain  3. lidoderm patch to R hip 12 hrs on , 12 off  4. percocet 5/325 mg prn mod pain  5. fall risk  6. to consider PT evaluation  7. 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 1.4 today and was 1.42       #elevated BP w/o hx HTN  likely due to pain  1. monitor      #DM II  1. hold januvia  2. BGM  3. ISS  4. Hb A1c  5. consistent carb diet        IMPROVE VTE Individual Risk Assessment    RISK                                                                Points    [  ] Previous VTE                                                  3    [  ] Thrombophilia                                               2    [  ] Lower limb paralysis                                      2        (unable to hold up >15 seconds)      [  ] Current Cancer                                              2         (within 6 months)    [  ] Immobilization > 24 hrs                                1    [  ] ICU/CCU stay > 24 hours                              1    [ X ] Age > 60                                                      1    IMPROVE VTE Score ________1    IMPROVE Score 0-1: Low Risk, No VTE prophylaxis required for most patients, encourage ambulation.   IMPROVE Score 2-3: At risk, pharmacologic VTE prophylaxis is indicated for most patients (in the absence of a contraindication)  IMPROVE Score > or = 4: High Risk, pharmacologic VTE prophylaxis is indicated for most patients (in the absence of a contraindication) 88 year old male DM II w chronic OA came to ED by ambulance c/o increased R hip pain and fall when his leg gave out        #Acute exacerbation of chronic R hip pain  xrays no evidence for fx; likely OA  unable to ambulate due to pain  s/p fall at home: initial encounter  1. admit to medicine  2. tylenol 650 mg prn temp or mild pain  3. lidoderm patch to R hip 12 hrs on , 12 off  4. percocet 5/325 mg prn mod pain  5. fall risk  6. to consider PT evaluation  7. to consider ortho  8. vit D 25 OH in AM  9. 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 1.4 today and was 1.42       #elevated BP w/o hx HTN  likely due to pain  1. monitor      #DM II  1. hold januvia  2. BGM  3. ISS  4. Hb A1c  5. consistent carb diet        IMPROVE VTE Individual Risk Assessment    RISK                                                                Points    [  ] Previous VTE                                                  3    [  ] Thrombophilia                                               2    [  ] Lower limb paralysis                                      2        (unable to hold up >15 seconds)      [  ] Current Cancer                                              2         (within 6 months)    [  ] Immobilization > 24 hrs                                1    [  ] ICU/CCU stay > 24 hours                              1    [ X ] Age > 60                                                      1    IMPROVE VTE Score ________1    IMPROVE Score 0-1: Low Risk, No VTE prophylaxis required for most patients, encourage ambulation.   IMPROVE Score 2-3: At risk, pharmacologic VTE prophylaxis is indicated for most patients (in the absence of a contraindication)  IMPROVE Score > or = 4: High Risk, pharmacologic VTE prophylaxis is indicated for most patients (in the absence of a contraindication)

## 2020-08-26 NOTE — H&P ADULT - ASSESSMENT
88f with history of chronic leg weakness       IMPROVE VTE Individual Risk Assessment          RISK                                                          Points  [  ] Previous VTE                                                3  [  ] Thrombophilia                                             2  [  ] Lower limb paralysis                                   2        (unable to hold up >15 seconds)    [  ] Current Cancer                                             2         (within 6 months)  [  ] Immobilization > 24 hrs                              1  [  ] ICU/CCU stay > 24 hours                             1  [  ] Age > 60                                                         1    IMPROVE VTE Score: 2

## 2020-08-26 NOTE — CONSULT NOTE ADULT - SUBJECTIVE AND OBJECTIVE BOX
88 year old male w DM II came to ED by ambulance c/o R hip pain and legs giving out on him    chronic LE weakness  states worsens today with unsteady per EMS (ambulance call report not available for review)  +fall due to R leg giving out at home  not dizzy or lightheaded  denied head injury or LOC  +pain to R hip 10/10; increased pain w weight bearing, not relieved w tylenol recently  +OA x 2-3 years to R hip; reported seeing a specialist then and he had "pump to help with circulation"    In ED /94  HR 74  RR 17  T 98.2  96% sat RA  Physical exam +TTP R lumbar and gluteus laterally, no bony tenderness reported  XRAY lumbar spine no fx, maintained vertebral heights  Pelvis: R hip DJD; Paget's disease L femur; no fx      PAST MEDICAL HX  DM diabetes mellitus  OA osteoarthritis R hip    PAST SURGICAL HX  H/O fracture of ankle: Right; placed hardware then removed hardware    FAMILY HX  denied DM  denied arthritis      SOCIAL HX  , lives w wife  Nonsmoker  No alcohol      ROS  GEN: no fever or chills  HEENT no recent illness  RESP no SOB  CARD no chest pain  GI no abd pain   no complaint  MSK R hip pain needing cane; occ    MEDS: Januvia 100 mg daily  tylenol prn pain    NKDA      T(C): 37.1 (08-26-20 @ 15:36), Max: 37.1 (08-26-20 @ 15:36)  HR: 76 (08-26-20 @ 15:36) (74 - 76)  BP: 143/93 (08-26-20 @ 15:36) (143/93 - 153/94)  RR: 19 (08-26-20 @ 15:36) (17 - 19)  SpO2: 98% (08-26-20 @ 15:36) (96% - 98%)    PHYSICAL EXAM: evaluation precludes physical exam. Pertinent physical exam findings as per video conference with teleNA at bedside is as follows:    Pleasant  elderly male noting some relief of pain w Lidoderm patch  Wearing facemask, able to speak in full sentences          DATA                        14.2   7.80  )-----------( 210      ( 26 Aug 2020 13:11 )             41.5     26 Aug 2020 13:11    143    |  107    |  13     ----------------------------<  72     4.1     |  31     |  1.40     Ca    9.2        26 Aug 2020 13:11    TPro  7.2    /  Alb  3.3    /  TBili  0.3    /  DBili  x      /  AST  17     /  ALT  30     /  AlkPhos  83     26 Aug 2020 13:11      CAPILLARY BLOOD GLUCOSE        LIVER FUNCTIONS - ( 26 Aug 2020 13:11 )  Alb: 3.3 g/dL / Pro: 7.2 gm/dL / ALK PHOS: 83 U/L / ALT: 30 U/L / AST: 17 U/L / GGT: x               RADIOLOGY      1) INTERPRETATION:  Right hip with pelvis. Patient had a fall and has local pain. 3 views.    There is mild degeneration at the corners of the right hip. Left hip is relatively free of degeneration.    There is no bone destruction or fracture.    Noted is Paget's disease in the upper left femur.    IMPRESSION: Hip degeneration on the right. Paget's disease upper left femur.        2) LUMBOSACRAL XRAYS    Findings:    There are 5 lumbar vertebral bodies. There is no significant listhesis. The vertebral body heights are maintained. The pedicle outlines are maintained.    IMPRESSION:    No radiographic evidence of acute fracture.    If there is further clinical concern or patient's symptoms persist, an MRI is suggested for further evaluation.

## 2020-08-26 NOTE — CONSULT NOTE ADULT - ATTENDING COMMENTS
VTE  MED REC      Follow up  PT  poss ortho VTE  MED REC  PCP  Dr. Maximilian Hammer [phone] at 16:58; spoke w him re admission after fall for R hip pain likely DJD only; asymptomatic prox L femur Paget disease, elevated Cr which seems to be pt baseline    Follow up  PT  poss ortho

## 2020-08-26 NOTE — ED PROVIDER NOTE - CLINICAL SUMMARY MEDICAL DECISION MAKING FREE TEXT BOX
87 yo M presenting with R buttock pain worse with supine position or when standing on leg. No red flag symptoms, full ROM b/l LE, no leg pain/swelling. No cp. No flank ttp or abdominal ttp. Acute on chronic pain, suspect osteoarthritis, vs radicular pain vs fx. unable to bear weight 2/2 pain, fall risk at home with multiple falls in past week, likely tba for pt/rehab

## 2020-08-26 NOTE — H&P ADULT - NSHPREVIEWOFSYSTEMS_GEN_ALL_CORE
CONSTITUTIONAL: No fever, weight loss, or fatigue  EYES: No eye pain, visual disturbances, or discharge  ENMT:  No difficulty hearing, tinnitus, vertigo; No sinus or throat pain  NECK: No pain or stiffness  RESPIRATORY: No cough, wheezing, chills or hemoptysis; No shortness of breath  CARDIOVASCULAR: No chest pain, palpitations, dizziness, or leg swelling  GASTROINTESTINAL: No abdominal or epigastric pain. No nausea, vomiting, or hematemesis; No diarrhea or constipation. No melena or hematochezia.  GENITOURINARY: No dysuria, frequency, hematuria, or incontinence  NEUROLOGICAL: No headaches, memory loss, loss of strength, numbness, or tremors  SKIN: No itching, burning, rashes, or lesions   LYMPH NODES: No enlarged glands  ENDOCRINE: No heat or cold intolerance; No hair loss  MUSCULOSKELETAL: leg weakness  PSYCHIATRIC: No depression, anxiety, mood swings, or difficulty sleeping  HEME/LYMPH: No easy bruising, or bleeding gums  ALLERGY AND IMMUNOLOGIC: No hives or eczema

## 2020-08-27 LAB
24R-OH-CALCIDIOL SERPL-MCNC: 17.8 NG/ML — LOW (ref 30–80)
A1C WITH ESTIMATED AVERAGE GLUCOSE RESULT: 6.3 % — HIGH (ref 4–5.6)
ESTIMATED AVERAGE GLUCOSE: 134 MG/DL — HIGH (ref 68–114)
GLUCOSE BLDC GLUCOMTR-MCNC: 101 MG/DL — HIGH (ref 70–99)
GLUCOSE BLDC GLUCOMTR-MCNC: 177 MG/DL — HIGH (ref 70–99)
GLUCOSE BLDC GLUCOMTR-MCNC: 265 MG/DL — HIGH (ref 70–99)
GLUCOSE BLDC GLUCOMTR-MCNC: 94 MG/DL — SIGNIFICANT CHANGE UP (ref 70–99)
SARS-COV-2 IGG SERPL QL IA: NEGATIVE — SIGNIFICANT CHANGE UP
SARS-COV-2 IGM SERPL IA-ACNC: <0.2 RATIO — SIGNIFICANT CHANGE UP
VIT B12 SERPL-MCNC: 362 PG/ML — SIGNIFICANT CHANGE UP (ref 232–1245)

## 2020-08-27 PROCEDURE — 99232 SBSQ HOSP IP/OBS MODERATE 35: CPT

## 2020-08-27 RX ORDER — DEXAMETHASONE 0.5 MG/5ML
4 ELIXIR ORAL
Refills: 0 | Status: COMPLETED | OUTPATIENT
Start: 2020-08-27 | End: 2020-08-27

## 2020-08-27 RX ORDER — CHOLECALCIFEROL (VITAMIN D3) 125 MCG
2000 CAPSULE ORAL DAILY
Refills: 0 | Status: DISCONTINUED | OUTPATIENT
Start: 2020-08-27 | End: 2020-08-28

## 2020-08-27 RX ORDER — PANTOPRAZOLE SODIUM 20 MG/1
40 TABLET, DELAYED RELEASE ORAL
Refills: 0 | Status: DISCONTINUED | OUTPATIENT
Start: 2020-08-27 | End: 2020-08-28

## 2020-08-27 RX ADMIN — Medication 4 MILLIGRAM(S): at 17:42

## 2020-08-27 RX ADMIN — LIDOCAINE 1 PATCH: 4 CREAM TOPICAL at 13:45

## 2020-08-27 RX ADMIN — HEPARIN SODIUM 5000 UNIT(S): 5000 INJECTION INTRAVENOUS; SUBCUTANEOUS at 06:55

## 2020-08-27 RX ADMIN — Medication 2: at 21:58

## 2020-08-27 RX ADMIN — PANTOPRAZOLE SODIUM 40 MILLIGRAM(S): 20 TABLET, DELAYED RELEASE ORAL at 13:47

## 2020-08-27 RX ADMIN — Medication 500 MILLIGRAM(S): at 13:47

## 2020-08-27 RX ADMIN — Medication 3 MILLIGRAM(S): at 21:56

## 2020-08-27 RX ADMIN — Medication 500 MILLIGRAM(S): at 13:48

## 2020-08-27 RX ADMIN — LIDOCAINE 1 PATCH: 4 CREAM TOPICAL at 01:26

## 2020-08-27 RX ADMIN — Medication 2: at 13:48

## 2020-08-27 RX ADMIN — Medication 2000 UNIT(S): at 13:45

## 2020-08-27 RX ADMIN — HEPARIN SODIUM 5000 UNIT(S): 5000 INJECTION INTRAVENOUS; SUBCUTANEOUS at 13:46

## 2020-08-27 RX ADMIN — Medication 500 MILLIGRAM(S): at 21:55

## 2020-08-27 RX ADMIN — HEPARIN SODIUM 5000 UNIT(S): 5000 INJECTION INTRAVENOUS; SUBCUTANEOUS at 21:54

## 2020-08-27 RX ADMIN — Medication 4 MILLIGRAM(S): at 13:46

## 2020-08-27 NOTE — PROGRESS NOTE ADULT - PROBLEM SELECTOR PLAN 1
likely has SI joint pain. no radiculopathy and already ambulating with a lyons. will start NSAIDs and steroids.   analgesics

## 2020-08-27 NOTE — PROGRESS NOTE ADULT - SUBJECTIVE AND OBJECTIVE BOX
Patient is a 88y old  Male who presents with a chief complaint of same (26 Aug 2020 16:25)      INTERVAL HPI/OVERNIGHT EVENTS: no events. denies any numbness or r LE weakness      MEDICATIONS  (STANDING):  cholecalciferol 2000 Unit(s) Oral daily  dexAMETHasone  Injectable 4 milliGRAM(s) IV Push two times a day  dextrose 5%. 1000 milliLiter(s) (50 mL/Hr) IV Continuous <Continuous>  dextrose 50% Injectable 12.5 Gram(s) IV Push once  dextrose 50% Injectable 25 Gram(s) IV Push once  dextrose 50% Injectable 25 Gram(s) IV Push once  heparin   Injectable 5000 Unit(s) SubCutaneous every 8 hours  insulin lispro (HumaLOG) corrective regimen sliding scale   SubCutaneous three times a day before meals  insulin lispro (HumaLOG) corrective regimen sliding scale   SubCutaneous at bedtime  lidocaine   Patch 1 Patch Transdermal daily  melatonin 3 milliGRAM(s) Oral at bedtime  naproxen 500 milliGRAM(s) Oral three times a day  pantoprazole    Tablet 40 milliGRAM(s) Oral before breakfast    MEDICATIONS  (PRN):  acetaminophen   Tablet .. 650 milliGRAM(s) Oral every 6 hours PRN Temp greater or equal to 38C (100.4F), Mild Pain (1 - 3)  aluminum hydroxide/magnesium hydroxide/simethicone Suspension 30 milliLiter(s) Oral every 4 hours PRN Dyspepsia  dextrose 40% Gel 15 Gram(s) Oral once PRN Blood Glucose LESS THAN 70 milliGRAM(s)/deciliter  glucagon  Injectable 1 milliGRAM(s) IntraMuscular once PRN Glucose LESS THAN 70 milligrams/deciliter  oxycodone    5 mG/acetaminophen 325 mG 1 Tablet(s) Oral every 6 hours PRN Moderate Pain (4 - 6)      Allergies    No Known Allergies    Intolerances        REVIEW OF SYSTEMS:  CONSTITUTIONAL: No fever, weight loss, or fatigue  EYES: No eye pain, visual disturbances, or discharge  ENMT:  No difficulty hearing, tinnitus, vertigo; No sinus or throat pain  NECK: No pain or stiffness  BREASTS: No pain, masses, or nipple discharge  RESPIRATORY: No cough, wheezing, chills or hemoptysis; No shortness of breath  CARDIOVASCULAR: No chest pain, palpitations, dizziness, or leg swelling  GASTROINTESTINAL: No abdominal or epigastric pain. No nausea, vomiting, or hematemesis; No diarrhea or constipation. No melena or hematochezia.  GENITOURINARY: No dysuria, frequency, hematuria, or incontinence  NEUROLOGICAL: No headaches, memory loss, loss of strength, numbness, or tremors  SKIN: No itching, burning, rashes, or lesions   LYMPH NODES: No enlarged glands  ENDOCRINE: No heat or cold intolerance; No hair loss  MUSCULOSKELETAL: No joint pain or swelling; No muscle, back, or extremity pain  PSYCHIATRIC: No depression, anxiety, mood swings, or difficulty sleeping  HEME/LYMPH: No easy bruising, or bleeding gums  ALLERGY AND IMMUNOLOGIC: No hives or eczema    Vital Signs Last 24 Hrs  T(C): 36.4 (27 Aug 2020 05:19), Max: 37.1 (26 Aug 2020 15:36)  T(F): 97.6 (27 Aug 2020 05:19), Max: 98.7 (26 Aug 2020 15:36)  HR: 75 (27 Aug 2020 05:19) (65 - 76)  BP: 146/86 (27 Aug 2020 05:19) (136/75 - 159/86)  BP(mean): --  RR: 16 (27 Aug 2020 05:19) (16 - 19)  SpO2: 99% (27 Aug 2020 05:19) (97% - 100%)    PHYSICAL EXAM:  GENERAL: NAD, well-groomed, well-developed  HEAD:  Atraumatic, Normocephalic  EYES: EOMI, PERRLA, conjunctiva and sclera clear  ENMT: No tonsillar erythema, exudates, or enlargement; Moist mucous membranes, Good dentition, No lesions  NECK: Supple, No JVD, Normal thyroid  NERVOUS SYSTEM:  Alert & Oriented X3, Good concentration; Motor Strength 5/5 B/L upper and lower extremities; DTRs 2+ intact and symmetric  CHEST/LUNG: Clear to percussion bilaterally; No rales, rhonchi, wheezing, or rubs  HEART: Regular rate and rhythm; No murmurs, rubs, or gallops  ABDOMEN: Soft, Nontender, Nondistended; Bowel sounds present  EXTREMITIES:  2+ Peripheral Pulses, No clubbing, cyanosis, or edema  BACK: normal leg roll, SI joint tenderness minimal straight leg pain   LYMPH: No lymphadenopathy noted  SKIN: No rashes or lesions    LABS:                        14.2   7.80  )-----------( 210      ( 26 Aug 2020 13:11 )             41.5     08-26    143  |  107  |  13  ----------------------------<  72  4.1   |  31  |  1.40<H>    Ca    9.2      26 Aug 2020 13:11    TPro  7.2  /  Alb  3.3  /  TBili  0.3  /  DBili  x   /  AST  17  /  ALT  30  /  AlkPhos  83  08-26        CAPILLARY BLOOD GLUCOSE      POCT Blood Glucose.: 177 mg/dL (27 Aug 2020 10:49)  POCT Blood Glucose.: 101 mg/dL (27 Aug 2020 08:03)  POCT Blood Glucose.: 100 mg/dL (26 Aug 2020 21:01)  POCT Blood Glucose.: 89 mg/dL (26 Aug 2020 17:12)      RADIOLOGY & ADDITIONAL TESTS:    Imaging Personally Reviewed:  [x ] YES  [ ] NO    Consultant(s) Notes Reviewed:  [ ] YES  [ ] NO    Care Discussed with Consultants/Other Providers [ ] YES  [ ] NO

## 2020-08-28 ENCOUNTER — TRANSCRIPTION ENCOUNTER (OUTPATIENT)
Age: 85
End: 2020-08-28

## 2020-08-28 VITALS
HEART RATE: 81 BPM | SYSTOLIC BLOOD PRESSURE: 143 MMHG | DIASTOLIC BLOOD PRESSURE: 71 MMHG | TEMPERATURE: 97 F | RESPIRATION RATE: 18 BRPM | OXYGEN SATURATION: 96 %

## 2020-08-28 LAB
ANION GAP SERPL CALC-SCNC: 8 MMOL/L — SIGNIFICANT CHANGE UP (ref 5–17)
BUN SERPL-MCNC: 26 MG/DL — HIGH (ref 7–23)
CALCIUM SERPL-MCNC: 9.6 MG/DL — SIGNIFICANT CHANGE UP (ref 8.5–10.1)
CHLORIDE SERPL-SCNC: 103 MMOL/L — SIGNIFICANT CHANGE UP (ref 96–108)
CO2 SERPL-SCNC: 27 MMOL/L — SIGNIFICANT CHANGE UP (ref 22–31)
CREAT SERPL-MCNC: 1.59 MG/DL — HIGH (ref 0.5–1.3)
GLUCOSE BLDC GLUCOMTR-MCNC: 107 MG/DL — HIGH (ref 70–99)
GLUCOSE BLDC GLUCOMTR-MCNC: 127 MG/DL — HIGH (ref 70–99)
GLUCOSE BLDC GLUCOMTR-MCNC: 168 MG/DL — HIGH (ref 70–99)
GLUCOSE SERPL-MCNC: 134 MG/DL — HIGH (ref 70–99)
HCT VFR BLD CALC: 46.7 % — SIGNIFICANT CHANGE UP (ref 39–50)
HGB BLD-MCNC: 16.2 G/DL — SIGNIFICANT CHANGE UP (ref 13–17)
MCHC RBC-ENTMCNC: 32 PG — SIGNIFICANT CHANGE UP (ref 27–34)
MCHC RBC-ENTMCNC: 34.7 GM/DL — SIGNIFICANT CHANGE UP (ref 32–36)
MCV RBC AUTO: 92.1 FL — SIGNIFICANT CHANGE UP (ref 80–100)
NRBC # BLD: 0 /100 WBCS — SIGNIFICANT CHANGE UP (ref 0–0)
PLATELET # BLD AUTO: 235 K/UL — SIGNIFICANT CHANGE UP (ref 150–400)
POTASSIUM SERPL-MCNC: 5.1 MMOL/L — SIGNIFICANT CHANGE UP (ref 3.5–5.3)
POTASSIUM SERPL-SCNC: 5.1 MMOL/L — SIGNIFICANT CHANGE UP (ref 3.5–5.3)
RBC # BLD: 5.07 M/UL — SIGNIFICANT CHANGE UP (ref 4.2–5.8)
RBC # FLD: 12.3 % — SIGNIFICANT CHANGE UP (ref 10.3–14.5)
SODIUM SERPL-SCNC: 138 MMOL/L — SIGNIFICANT CHANGE UP (ref 135–145)
WBC # BLD: 9.46 K/UL — SIGNIFICANT CHANGE UP (ref 3.8–10.5)
WBC # FLD AUTO: 9.46 K/UL — SIGNIFICANT CHANGE UP (ref 3.8–10.5)

## 2020-08-28 PROCEDURE — 99239 HOSP IP/OBS DSCHRG MGMT >30: CPT

## 2020-08-28 RX ORDER — LACTULOSE 10 G/15ML
20 SOLUTION ORAL ONCE
Refills: 0 | Status: COMPLETED | OUTPATIENT
Start: 2020-08-28 | End: 2020-08-28

## 2020-08-28 RX ORDER — SOD,AMMONIUM,POTASSIUM LACTATE
1 CREAM (GRAM) TOPICAL
Qty: 0 | Refills: 0 | DISCHARGE

## 2020-08-28 RX ORDER — LIDOCAINE 4 G/100G
1 CREAM TOPICAL
Qty: 1 | Refills: 0
Start: 2020-08-28

## 2020-08-28 RX ORDER — CHOLECALCIFEROL (VITAMIN D3) 125 MCG
2000 CAPSULE ORAL
Qty: 0 | Refills: 0 | DISCHARGE
Start: 2020-08-28

## 2020-08-28 RX ADMIN — Medication 2000 UNIT(S): at 11:18

## 2020-08-28 RX ADMIN — PANTOPRAZOLE SODIUM 40 MILLIGRAM(S): 20 TABLET, DELAYED RELEASE ORAL at 06:46

## 2020-08-28 RX ADMIN — LIDOCAINE 1 PATCH: 4 CREAM TOPICAL at 18:05

## 2020-08-28 RX ADMIN — LACTULOSE 20 GRAM(S): 10 SOLUTION ORAL at 11:28

## 2020-08-28 RX ADMIN — OXYCODONE AND ACETAMINOPHEN 1 TABLET(S): 5; 325 TABLET ORAL at 16:10

## 2020-08-28 RX ADMIN — Medication 500 MILLIGRAM(S): at 18:05

## 2020-08-28 RX ADMIN — Medication 2: at 11:18

## 2020-08-28 RX ADMIN — LIDOCAINE 1 PATCH: 4 CREAM TOPICAL at 01:22

## 2020-08-28 RX ADMIN — HEPARIN SODIUM 5000 UNIT(S): 5000 INJECTION INTRAVENOUS; SUBCUTANEOUS at 13:09

## 2020-08-28 RX ADMIN — LIDOCAINE 1 PATCH: 4 CREAM TOPICAL at 11:18

## 2020-08-28 RX ADMIN — OXYCODONE AND ACETAMINOPHEN 1 TABLET(S): 5; 325 TABLET ORAL at 15:12

## 2020-08-28 RX ADMIN — Medication 500 MILLIGRAM(S): at 06:46

## 2020-08-28 RX ADMIN — HEPARIN SODIUM 5000 UNIT(S): 5000 INJECTION INTRAVENOUS; SUBCUTANEOUS at 06:46

## 2020-08-28 NOTE — PHYSICAL THERAPY INITIAL EVALUATION ADULT - PERTINENT HX OF CURRENT PROBLEM, REHAB EVAL
Patient is an 89 y/o male admitted to [facility] due to right hip pain and lowback pain. X-ray of right hip and lowback negative fracture.

## 2020-08-28 NOTE — DISCHARGE NOTE PROVIDER - NSFOLLOWUPCLINICS_GEN_ALL_ED_FT
[facility] [place]  Orthopedics  .  [place]   Phone: [phone]  Fax:   Follow Up Time: [facility] [place]  Orthopedics  .  [place]   Phone: [phone]  Fax:     [facility]  Podiatry  [address] [place] [address] [place] [address]  Phone: [phone]  Fax:   Follow Up Time:

## 2020-08-28 NOTE — PHYSICAL THERAPY INITIAL EVALUATION ADULT - ADDITIONAL COMMENTS
Patient lives c wife in a Coop c elevator, no entry steps, 1 level inside home. Independent c all ADL's and ambulation with straight cane. Pt also has rolling walker.

## 2020-08-28 NOTE — DISCHARGE NOTE NURSING/CASE MANAGEMENT/SOCIAL WORK - PATIENT PORTAL LINK FT
You can access the FollowMyHealth Patient Portal offered by [facility] by registering at the following website: [email or web address]. By joining [facility]’s FollowMyHealth portal, you will also be able to view your health information using other applications (apps) compatible with our system.

## 2020-08-28 NOTE — PHYSICAL THERAPY INITIAL EVALUATION ADULT - TINETTI GAIT TEST, REHAB EVAL
Indication of gait -1/1,   Step Length and height -1/2,   Foot Clearance -2/2,   Step Symmetry - 1/1,  Step Continuity -1/1,   Path -1/ 2,   Trunk -2/2,   Walking Time -1/1,   Total Score - 10/12

## 2020-08-28 NOTE — DISCHARGE NOTE PROVIDER - HOSPITAL COURSE
88f with history of chronic leg weakness     Back pain.  Plan: likely has SI joint pain. no radiculopathy and already ambulating with a lyons. will started NSAIDs and steroids and has great imrpovemnt. awaiting pt consult but likely pt to be dc today     analgesics.          Problem/Plan - 2:    ·  Problem: Ambulatory dysfunction.  Plan: physical therapy eval.          Problem/Plan - 3:    ·  Problem: Type 2 diabetes mellitus without complication, without long-term current use of insulin.  Plan: riss    instruct pt to monitor fs while on steroids.             Pt recs outpt PT.    Cleared for discharge by Dr. Siddiqui on 8/28/2020.

## 2020-08-28 NOTE — PROGRESS NOTE ADULT - SUBJECTIVE AND OBJECTIVE BOX
Patient is a 88y old  Male who presents with a chief complaint of same (26 Aug 2020 16:25)      INTERVAL HPI/OVERNIGHT EVENTS: no events. pain much improved     MEDICATIONS  (STANDING):  cholecalciferol 2000 Unit(s) Oral daily  dextrose 5%. 1000 milliLiter(s) (50 mL/Hr) IV Continuous <Continuous>  dextrose 50% Injectable 12.5 Gram(s) IV Push once  dextrose 50% Injectable 25 Gram(s) IV Push once  dextrose 50% Injectable 25 Gram(s) IV Push once  heparin   Injectable 5000 Unit(s) SubCutaneous every 8 hours  insulin lispro (HumaLOG) corrective regimen sliding scale   SubCutaneous three times a day before meals  insulin lispro (HumaLOG) corrective regimen sliding scale   SubCutaneous at bedtime  lidocaine   Patch 1 Patch Transdermal daily  melatonin 3 milliGRAM(s) Oral at bedtime  naproxen 500 milliGRAM(s) Oral two times a day  pantoprazole    Tablet 40 milliGRAM(s) Oral before breakfast    MEDICATIONS  (PRN):  acetaminophen   Tablet .. 650 milliGRAM(s) Oral every 6 hours PRN Temp greater or equal to 38C (100.4F), Mild Pain (1 - 3)  aluminum hydroxide/magnesium hydroxide/simethicone Suspension 30 milliLiter(s) Oral every 4 hours PRN Dyspepsia  dextrose 40% Gel 15 Gram(s) Oral once PRN Blood Glucose LESS THAN 70 milliGRAM(s)/deciliter  glucagon  Injectable 1 milliGRAM(s) IntraMuscular once PRN Glucose LESS THAN 70 milligrams/deciliter  oxycodone    5 mG/acetaminophen 325 mG 1 Tablet(s) Oral every 6 hours PRN Moderate Pain (4 - 6)        Allergies    No Known Allergies    Intolerances        REVIEW OF SYSTEMS:  CONSTITUTIONAL: No fever, weight loss, or fatigue  EYES: No eye pain, visual disturbances, or discharge  ENMT:  No difficulty hearing, tinnitus, vertigo; No sinus or throat pain  NECK: No pain or stiffness  BREASTS: No pain, masses, or nipple discharge  RESPIRATORY: No cough, wheezing, chills or hemoptysis; No shortness of breath  CARDIOVASCULAR: No chest pain, palpitations, dizziness, or leg swelling  GASTROINTESTINAL: No abdominal or epigastric pain. No nausea, vomiting, or hematemesis; No diarrhea or constipation. No melena or hematochezia.  GENITOURINARY: No dysuria, frequency, hematuria, or incontinence  NEUROLOGICAL: No headaches, memory loss, loss of strength, numbness, or tremors  SKIN: No itching, burning, rashes, or lesions   LYMPH NODES: No enlarged glands  ENDOCRINE: No heat or cold intolerance; No hair loss  MUSCULOSKELETAL: No joint pain or swelling; No muscle, back, or extremity pain  PSYCHIATRIC: No depression, anxiety, mood swings, or difficulty sleeping  HEME/LYMPH: No easy bruising, or bleeding gums  ALLERGY AND IMMUNOLOGIC: No hives or eczema    Vital Signs Last 24 Hrs  T(C): 36.4 (28 Aug 2020 05:11), Max: 36.9 (27 Aug 2020 23:38)  T(F): 97.6 (28 Aug 2020 05:11), Max: 98.4 (27 Aug 2020 23:38)  HR: 70 (28 Aug 2020 05:11) (70 - 79)  BP: 123/77 (28 Aug 2020 05:11) (118/53 - 137/69)  BP(mean): --  RR: 16 (28 Aug 2020 05:11) (16 - 18)  SpO2: 96% (28 Aug 2020 05:11) (94% - 96%)      PHYSICAL EXAM:  GENERAL: NAD, well-groomed, well-developed  HEAD:  Atraumatic, Normocephalic  EYES: EOMI, PERRLA, conjunctiva and sclera clear  ENMT: No tonsillar erythema, exudates, or enlargement; Moist mucous membranes, Good dentition, No lesions  NECK: Supple, No JVD, Normal thyroid  NERVOUS SYSTEM:  Alert & Oriented X3, Good concentration; Motor Strength 5/5 B/L upper and lower extremities; DTRs 2+ intact and symmetric  CHEST/LUNG: Clear to percussion bilaterally; No rales, rhonchi, wheezing, or rubs  HEART: Regular rate and rhythm; No murmurs, rubs, or gallops  ABDOMEN: Soft, Nontender, Nondistended; Bowel sounds present  EXTREMITIES:  2+ Peripheral Pulses, No clubbing, cyanosis, or edema  BACK: normal leg roll, SI joint tenderness minimal straight leg pain   LYMPH: No lymphadenopathy noted  SKIN: No rashes or lesions    LABS:                                        16.2   9.46  )-----------( 235      ( 28 Aug 2020 07:34 )             46.7     08-28    138  |  103  |  26<H>  ----------------------------<  134<H>  5.1   |  27  |  1.59<H>    Ca    9.6      28 Aug 2020 07:34    TPro  7.2  /  Alb  3.3  /  TBili  0.3  /  DBili  x   /  AST  17  /  ALT  30  /  AlkPhos  83  08-26          CAPILLARY BLOOD GLUCOSE      POCT Blood Glucose.: 177 mg/dL (27 Aug 2020 10:49)  POCT Blood Glucose.: 101 mg/dL (27 Aug 2020 08:03)  POCT Blood Glucose.: 100 mg/dL (26 Aug 2020 21:01)  POCT Blood Glucose.: 89 mg/dL (26 Aug 2020 17:12)      RADIOLOGY & ADDITIONAL TESTS:    Imaging Personally Reviewed:  [x ] YES  [ ] NO    Consultant(s) Notes Reviewed:  [ ] YES  [ ] NO    Care Discussed with Consultants/Other Providers [ ] YES  [ ] NO

## 2020-08-28 NOTE — DISCHARGE NOTE PROVIDER - PROVIDER TOKENS
FREE:[LAST:[your PCP],PHONE:[(   )    -],FAX:[(   )    -]] FREE:[LAST:[your PCP],PHONE:[(   )    -],FAX:[(   )    -]],PROVIDER:[TOKEN:[[id number]]]

## 2020-08-28 NOTE — DISCHARGE NOTE PROVIDER - CARE PROVIDER_API CALL
your PCP,   Phone: (   )    -  Fax: (   )    -  Follow Up Time: your PCP,   Phone: (   )    -  Fax: (   )    -  Follow Up Time:     Nancy Romano  [facility]  [address] [facility] [address]  [place] [address]  Phone: [phone]  Fax: [phone]  Follow Up Time:

## 2020-08-28 NOTE — PHYSICAL THERAPY INITIAL EVALUATION ADULT - TINETTI BALANCE TEST, REHAB EVAL
Sitting Balance - 1/1 , Rises From Chair - 1/2, Attempts to rise - 2/2 , Immediate Standing Balance - 2/2,  Standing Balance - 2/2, Nudged -  2/2, Eyes Closed - 1/1,  Turning 360 Deg -  1/2, Sitting down - 2/2, Balance Score - 14/16

## 2020-08-28 NOTE — DISCHARGE NOTE NURSING/CASE MANAGEMENT/SOCIAL WORK - NSDCPEPTCAREGIVEDUMATLIST _GEN_ALL_CORE
Diabetes Interpolation Flap Text: A decision was made to reconstruct the defect utilizing an interpolation axial flap and a staged reconstruction.  A telfa template was made of the defect.  This telfa template was then used to outline the interpolation flap.  The donor area for the pedicle flap was then injected with anesthesia.  The flap was excised through the skin and subcutaneous tissue down to the layer of the underlying musculature.  The interpolation flap was carefully excised within this deep plane to maintain its blood supply.  The edges of the donor site were undermined.   The donor site was closed in a primary fashion.  The pedicle was then rotated into position and sutured.  Once the tube was sutured into place, adequate blood supply was confirmed with blanching and refill.  The pedicle was then wrapped with xeroform gauze and dressed appropriately with a telfa and gauze bandage to ensure continued blood supply and protect the attached pedicle.

## 2020-08-28 NOTE — DISCHARGE NOTE PROVIDER - NSDCCPCAREPLAN_GEN_ALL_CORE_FT
PRINCIPAL DISCHARGE DIAGNOSIS  Diagnosis: Back pain  Assessment and Plan of Treatment: Continue pain medication as needed.  Continue steroid dose-pack, use as directed until completed.  Please follow up with your PCP within 1-2 weeks of discharge.      SECONDARY DISCHARGE DIAGNOSES  Diagnosis: Type 2 diabetes mellitus without complication, without long-term current use of insulin  Assessment and Plan of Treatment: Monitor sugar while on steroids.

## 2020-08-28 NOTE — PROGRESS NOTE ADULT - PROBLEM SELECTOR PLAN 1
likely has SI joint pain. no radiculopathy and already ambulating with a lyons. will started NSAIDs and steroids and has great imrpovemnt. awaiting pt consult but likely pt to be dc today   analgesics

## 2020-08-28 NOTE — DISCHARGE NOTE PROVIDER - NSDCMRMEDTOKEN_GEN_ALL_CORE_FT
cholecalciferol oral tablet: 2000 unit(s) orally once a day  Januvia 100 mg oral tablet: 1 tab(s) orally once a day  lidocaine 5% topical film: Apply topically to affected area once a day     **if not covered please offer salon-pas**  Medrol Dosepak 4 mg oral tablet: **use as directed**  naproxen 500 mg oral tablet: 1 tab(s) orally 2 times a day, As Needed cholecalciferol oral tablet: 2000 unit(s) orally once a day  Januvia 100 mg oral tablet: 1 tab(s) orally once a day  lidocaine 5% topical film: Apply topically to affected area once a day     **if not covered please offer salon-pas**  Medrol Dosepak 4 mg oral tablet: **use as directed**  naproxen 500 mg oral tablet: 1 tab(s) orally 2 times a day, As Needed   outpatient PT: outpatient PT  right hip pain Home

## 2020-08-28 NOTE — PHYSICAL THERAPY INITIAL EVALUATION ADULT - GENERAL OBSERVATIONS, REHAB EVAL
Chart (EMR) reviewed. Received sitting at bedside chair, NAD. +heplock. Alert. Ox4. Able to follow multistep commands/directions.

## 2020-09-02 ENCOUNTER — INPATIENT (INPATIENT)
Facility: HOSPITAL | Age: 85
LOS: 8 days | Discharge: HOME HEALTH | End: 2020-09-11
Attending: INTERNAL MEDICINE | Admitting: INTERNAL MEDICINE
Payer: MEDICARE

## 2020-09-02 VITALS
RESPIRATION RATE: 18 BRPM | DIASTOLIC BLOOD PRESSURE: 62 MMHG | HEIGHT: 74 IN | HEART RATE: 78 BPM | TEMPERATURE: 99 F | SYSTOLIC BLOOD PRESSURE: 144 MMHG | OXYGEN SATURATION: 99 %

## 2020-09-02 DIAGNOSIS — M88.9 OSTEITIS DEFORMANS OF UNSPECIFIED BONE: ICD-10-CM

## 2020-09-02 DIAGNOSIS — Z29.9 ENCOUNTER FOR PROPHYLACTIC MEASURES, UNSPECIFIED: ICD-10-CM

## 2020-09-02 DIAGNOSIS — Z87.81 PERSONAL HISTORY OF (HEALED) TRAUMATIC FRACTURE: Chronic | ICD-10-CM

## 2020-09-02 DIAGNOSIS — G89.29 OTHER CHRONIC PAIN: ICD-10-CM

## 2020-09-02 DIAGNOSIS — R03.0 ELEVATED BLOOD-PRESSURE READING, WITHOUT DIAGNOSIS OF HYPERTENSION: ICD-10-CM

## 2020-09-02 DIAGNOSIS — D72.828 OTHER ELEVATED WHITE BLOOD CELL COUNT: ICD-10-CM

## 2020-09-02 DIAGNOSIS — M88.852: ICD-10-CM

## 2020-09-02 DIAGNOSIS — R26.2 DIFFICULTY IN WALKING, NOT ELSEWHERE CLASSIFIED: ICD-10-CM
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
## 2020-09-02 DIAGNOSIS — M53.3 SACROCOCCYGEAL DISORDERS, NOT ELSEWHERE CLASSIFIED: ICD-10-CM

## 2020-09-02 DIAGNOSIS — M46.1 SACROILIITIS, NOT ELSEWHERE CLASSIFIED: ICD-10-CM

## 2020-09-02 DIAGNOSIS — Z91.81 HISTORY OF FALLING: ICD-10-CM

## 2020-09-02 DIAGNOSIS — E11.22 TYPE 2 DIABETES MELLITUS WITH DIABETIC CHRONIC KIDNEY DISEASE: ICD-10-CM

## 2020-09-02 DIAGNOSIS — E11.9 TYPE 2 DIABETES MELLITUS WITHOUT COMPLICATIONS: ICD-10-CM

## 2020-09-02 DIAGNOSIS — N17.9 ACUTE KIDNEY FAILURE, UNSPECIFIED: ICD-10-CM

## 2020-09-02 LAB
ALBUMIN SERPL ELPH-MCNC: 4.1 G/DL — SIGNIFICANT CHANGE UP (ref 3.3–5)
ALP SERPL-CCNC: 79 U/L — SIGNIFICANT CHANGE UP (ref 40–120)
ALT FLD-CCNC: 34 U/L — SIGNIFICANT CHANGE UP (ref 4–41)
ANION GAP SERPL CALC-SCNC: 14 MMO/L — SIGNIFICANT CHANGE UP (ref 7–14)
APPEARANCE UR: CLEAR — SIGNIFICANT CHANGE UP
AST SERPL-CCNC: 17 U/L — SIGNIFICANT CHANGE UP (ref 4–40)
BACTERIA # UR AUTO: NEGATIVE — SIGNIFICANT CHANGE UP
BASOPHILS # BLD AUTO: 0.03 K/UL — SIGNIFICANT CHANGE UP (ref 0–0.2)
BASOPHILS NFR BLD AUTO: 0.2 % — SIGNIFICANT CHANGE UP (ref 0–2)
BILIRUB SERPL-MCNC: 0.3 MG/DL — SIGNIFICANT CHANGE UP (ref 0.2–1.2)
BILIRUB UR-MCNC: NEGATIVE — SIGNIFICANT CHANGE UP
BLOOD UR QL VISUAL: NEGATIVE — SIGNIFICANT CHANGE UP
BUN SERPL-MCNC: 36 MG/DL — HIGH (ref 7–23)
CALCIUM SERPL-MCNC: 9.8 MG/DL — SIGNIFICANT CHANGE UP (ref 8.4–10.5)
CHLORIDE SERPL-SCNC: 101 MMOL/L — SIGNIFICANT CHANGE UP (ref 98–107)
CO2 SERPL-SCNC: 24 MMOL/L — SIGNIFICANT CHANGE UP (ref 22–31)
COLOR SPEC: SIGNIFICANT CHANGE UP
CREAT SERPL-MCNC: 1.35 MG/DL — HIGH (ref 0.5–1.3)
EOSINOPHIL # BLD AUTO: 0.06 K/UL — SIGNIFICANT CHANGE UP (ref 0–0.5)
EOSINOPHIL NFR BLD AUTO: 0.4 % — SIGNIFICANT CHANGE UP (ref 0–6)
GLUCOSE SERPL-MCNC: 123 MG/DL — HIGH (ref 70–99)
GLUCOSE UR-MCNC: NEGATIVE — SIGNIFICANT CHANGE UP
HCT VFR BLD CALC: 45 % — SIGNIFICANT CHANGE UP (ref 39–50)
HGB BLD-MCNC: 15.5 G/DL — SIGNIFICANT CHANGE UP (ref 13–17)
HYALINE CASTS # UR AUTO: NEGATIVE — SIGNIFICANT CHANGE UP
IMM GRANULOCYTES NFR BLD AUTO: 1.5 % — SIGNIFICANT CHANGE UP (ref 0–1.5)
KETONES UR-MCNC: NEGATIVE — SIGNIFICANT CHANGE UP
LEUKOCYTE ESTERASE UR-ACNC: NEGATIVE — SIGNIFICANT CHANGE UP
LYMPHOCYTES # BLD AUTO: 1.97 K/UL — SIGNIFICANT CHANGE UP (ref 1–3.3)
LYMPHOCYTES # BLD AUTO: 14.7 % — SIGNIFICANT CHANGE UP (ref 13–44)
MCHC RBC-ENTMCNC: 32.8 PG — SIGNIFICANT CHANGE UP (ref 27–34)
MCHC RBC-ENTMCNC: 34.4 % — SIGNIFICANT CHANGE UP (ref 32–36)
MCV RBC AUTO: 95.1 FL — SIGNIFICANT CHANGE UP (ref 80–100)
MONOCYTES # BLD AUTO: 0.9 K/UL — SIGNIFICANT CHANGE UP (ref 0–0.9)
MONOCYTES NFR BLD AUTO: 6.7 % — SIGNIFICANT CHANGE UP (ref 2–14)
NEUTROPHILS # BLD AUTO: 10.21 K/UL — HIGH (ref 1.8–7.4)
NEUTROPHILS NFR BLD AUTO: 76.5 % — SIGNIFICANT CHANGE UP (ref 43–77)
NITRITE UR-MCNC: NEGATIVE — SIGNIFICANT CHANGE UP
NRBC # FLD: 0 K/UL — SIGNIFICANT CHANGE UP (ref 0–0)
PH UR: 6 — SIGNIFICANT CHANGE UP (ref 5–8)
PLATELET # BLD AUTO: 260 K/UL — SIGNIFICANT CHANGE UP (ref 150–400)
PMV BLD: 9.6 FL — SIGNIFICANT CHANGE UP (ref 7–13)
POTASSIUM SERPL-MCNC: 4.4 MMOL/L — SIGNIFICANT CHANGE UP (ref 3.5–5.3)
POTASSIUM SERPL-SCNC: 4.4 MMOL/L — SIGNIFICANT CHANGE UP (ref 3.5–5.3)
PROT SERPL-MCNC: 7.4 G/DL — SIGNIFICANT CHANGE UP (ref 6–8.3)
PROT UR-MCNC: 50 — SIGNIFICANT CHANGE UP
RBC # BLD: 4.73 M/UL — SIGNIFICANT CHANGE UP (ref 4.2–5.8)
RBC # FLD: 12.8 % — SIGNIFICANT CHANGE UP (ref 10.3–14.5)
RBC CASTS # UR COMP ASSIST: SIGNIFICANT CHANGE UP (ref 0–?)
SARS-COV-2 RNA SPEC QL NAA+PROBE: SIGNIFICANT CHANGE UP
SODIUM SERPL-SCNC: 139 MMOL/L — SIGNIFICANT CHANGE UP (ref 135–145)
SP GR SPEC: 1.02 — SIGNIFICANT CHANGE UP (ref 1–1.04)
SQUAMOUS # UR AUTO: SIGNIFICANT CHANGE UP
UROBILINOGEN FLD QL: NORMAL — SIGNIFICANT CHANGE UP
WBC # BLD: 13.37 K/UL — HIGH (ref 3.8–10.5)
WBC # FLD AUTO: 13.37 K/UL — HIGH (ref 3.8–10.5)
WBC UR QL: SIGNIFICANT CHANGE UP (ref 0–?)

## 2020-09-02 PROCEDURE — 99284 EMERGENCY DEPT VISIT MOD MDM: CPT

## 2020-09-02 PROCEDURE — 76376 3D RENDER W/INTRP POSTPROCES: CPT | Mod: 26

## 2020-09-02 PROCEDURE — 99223 1ST HOSP IP/OBS HIGH 75: CPT | Mod: GC

## 2020-09-02 PROCEDURE — 72131 CT LUMBAR SPINE W/O DYE: CPT | Mod: 26

## 2020-09-02 PROCEDURE — 71045 X-RAY EXAM CHEST 1 VIEW: CPT | Mod: 26

## 2020-09-02 PROCEDURE — 72192 CT PELVIS W/O DYE: CPT | Mod: 26

## 2020-09-02 RX ORDER — HEPARIN SODIUM 5000 [USP'U]/ML
5000 INJECTION INTRAVENOUS; SUBCUTANEOUS EVERY 8 HOURS
Refills: 0 | Status: DISCONTINUED | OUTPATIENT
Start: 2020-09-02 | End: 2020-09-11

## 2020-09-02 RX ORDER — DEXTROSE 50 % IN WATER 50 %
25 SYRINGE (ML) INTRAVENOUS ONCE
Refills: 0 | Status: DISCONTINUED | OUTPATIENT
Start: 2020-09-02 | End: 2020-09-11

## 2020-09-02 RX ORDER — ACETAMINOPHEN 500 MG
650 TABLET ORAL ONCE
Refills: 0 | Status: COMPLETED | OUTPATIENT
Start: 2020-09-02 | End: 2020-09-02

## 2020-09-02 RX ORDER — SITAGLIPTIN 50 MG/1
1 TABLET, FILM COATED ORAL
Qty: 0 | Refills: 0 | DISCHARGE

## 2020-09-02 RX ORDER — INSULIN LISPRO 100/ML
VIAL (ML) SUBCUTANEOUS AT BEDTIME
Refills: 0 | Status: DISCONTINUED | OUTPATIENT
Start: 2020-09-02 | End: 2020-09-11

## 2020-09-02 RX ORDER — SODIUM CHLORIDE 9 MG/ML
1000 INJECTION, SOLUTION INTRAVENOUS
Refills: 0 | Status: DISCONTINUED | OUTPATIENT
Start: 2020-09-02 | End: 2020-09-11

## 2020-09-02 RX ORDER — DEXTROSE 50 % IN WATER 50 %
15 SYRINGE (ML) INTRAVENOUS ONCE
Refills: 0 | Status: DISCONTINUED | OUTPATIENT
Start: 2020-09-02 | End: 2020-09-11

## 2020-09-02 RX ORDER — INSULIN LISPRO 100/ML
VIAL (ML) SUBCUTANEOUS
Refills: 0 | Status: DISCONTINUED | OUTPATIENT
Start: 2020-09-02 | End: 2020-09-11

## 2020-09-02 RX ORDER — GLUCAGON INJECTION, SOLUTION 0.5 MG/.1ML
1 INJECTION, SOLUTION SUBCUTANEOUS ONCE
Refills: 0 | Status: DISCONTINUED | OUTPATIENT
Start: 2020-09-02 | End: 2020-09-11

## 2020-09-02 RX ORDER — INFLUENZA VIRUS VACCINE 15; 15; 15; 15 UG/.5ML; UG/.5ML; UG/.5ML; UG/.5ML
0.5 SUSPENSION INTRAMUSCULAR ONCE
Refills: 0 | Status: DISCONTINUED | OUTPATIENT
Start: 2020-09-02 | End: 2020-09-11

## 2020-09-02 RX ORDER — DEXTROSE 50 % IN WATER 50 %
12.5 SYRINGE (ML) INTRAVENOUS ONCE
Refills: 0 | Status: DISCONTINUED | OUTPATIENT
Start: 2020-09-02 | End: 2020-09-11

## 2020-09-02 RX ORDER — LIDOCAINE 4 G/100G
1 CREAM TOPICAL ONCE
Refills: 0 | Status: COMPLETED | OUTPATIENT
Start: 2020-09-02 | End: 2020-09-02

## 2020-09-02 RX ADMIN — LIDOCAINE 1 PATCH: 4 CREAM TOPICAL at 22:04

## 2020-09-02 RX ADMIN — LIDOCAINE 1 PATCH: 4 CREAM TOPICAL at 19:45

## 2020-09-02 RX ADMIN — LIDOCAINE 1 PATCH: 4 CREAM TOPICAL at 10:34

## 2020-09-02 RX ADMIN — HEPARIN SODIUM 5000 UNIT(S): 5000 INJECTION INTRAVENOUS; SUBCUTANEOUS at 22:02

## 2020-09-02 RX ADMIN — Medication 650 MILLIGRAM(S): at 10:34

## 2020-09-02 RX ADMIN — Medication 650 MILLIGRAM(S): at 15:25

## 2020-09-02 NOTE — H&P ADULT - NSHPREVIEWOFSYSTEMS_GEN_ALL_CORE
CONSTITUTIONAL:  No weight loss, fever, chills, weakness or fatigue.  HEENT:  Eyes:  No visual loss, blurred vision, double vision or yellow sclerae.   Ears, Nose, Throat:  No hearing loss, sneezing, congestion, runny nose or sore throat.  SKIN:  No rash or itching.  CARDIOVASCULAR:  No chest pain, chest pressure or chest discomfort. No palpitations.  RESPIRATORY:  No shortness of breath, cough or sputum.  GASTROINTESTINAL:  (+) constipation No anorexia, nausea, vomiting or diarrhea. No abdominal pain or blood.  GENITOURINARY:  (+) nocturnal polyuria Denies hematuria, dysuria.   NEUROLOGICAL:  (+) numbness of Rt foot No headache, dizziness, syncope, paralysis, ataxia,  No change in bowel or bladder control.  MUSCULOSKELETAL:  (+) lower R back pain No muscle, joint pain or stiffness.  HEMATOLOGIC:  No anemia, bleeding or bruising.  ENDOCRINOLOGIC:  No reports of sweating, cold or heat intolerance. No polyuria or polydipsia.  ALLERGIES:  No history of asthma, hives, eczema or rhinitis. CONSTITUTIONAL:  No weight loss, fever, chills, weakness or fatigue.  HEENT:  Eyes:  No visual loss, blurred vision, double vision or yellow sclerae.   Ears, Nose, Throat:  No hearing loss, sneezing, congestion, runny nose or sore throat.  SKIN:  No rash or itching.  CARDIOVASCULAR:  No chest pain, chest pressure or chest discomfort. No palpitations.  RESPIRATORY:  No shortness of breath, cough or sputum.  GASTROINTESTINAL:  (+) constipation No anorexia, nausea, vomiting or diarrhea. No abdominal pain or blood.  GENITOURINARY:  (+) nocturnal polyuria Denies hematuria, dysuria.   NEUROLOGICAL:  (+) numbness of Rt foot No headache, dizziness, syncope, paralysis, ataxia,  No change in bowel or bladder control.  MUSCULOSKELETAL:  (+) lower R back pain No muscle, joint pain or stiffness.   HEMATOLOGIC:  No anemia, bleeding or bruising.  ENDOCRINOLOGIC:  No reports of sweating, cold or heat intolerance. No polyuria or polydipsia.  ALLERGIES:  No history of asthma, hives, eczema or rhinitis.

## 2020-09-02 NOTE — H&P ADULT - ATTENDING COMMENTS
88M DM type 2 - A1c 6.3, OA, multiple falls recently, admission to [facility] for falls, sent home p/w recurrance of fall c/b RONDA, unsafe disposition at home, unable to take care of ADLs.  Likely due to advancing OA.  PT eval.  SW eval for safe disposition.  Monitor Crt - likely has CKD at baseline from DM type 2.

## 2020-09-02 NOTE — ED ADULT NURSE NOTE - OBJECTIVE STATEMENT
pt recently DC from [facility] for back pain with fall and inability to properly ambulate. patient was DC with dx of SI joint dysfunction and given f/u to ortho. pt did not follow up and states he was not given a dx and did not know what to do or how to properly follow up. pt reports that since hes been home he did fall once described as his right leg giving out and landing on his rear end. states he has had trouble walking since. no hip pain or deformity no shortening or rotation. denies striking head denies LOC before or after the fall denies felling dizzy leading to the fall. pt able to raise the right leg but with pain. VSS IV 20 g RAC labs sent TQ removed. pt states he can not ambulate at home.

## 2020-09-02 NOTE — H&P ADULT - PROBLEM SELECTOR PLAN 3
Leukocytosis 13.37  Most likely reactive from fall  U/A negative, afebrile, less likely infectious    Plan:   - Monitor CBC  - CXR

## 2020-09-02 NOTE — H&P ADULT - NSHPSOCIALHISTORY_GEN_ALL_CORE
Lives with wife in [place], used to work for [employer], quit 26 years ago. Was able to complete all activities of daily living before fall including walking around without assistance or cane, showering, and eating.

## 2020-09-02 NOTE — ED ADULT NURSE NOTE - CHIEF COMPLAINT QUOTE
Pt arrives with EMS c/o lower back pain. Pt had a mechanical fall 1 week ago, no LOC, no head trauma. Was admitted to [facility] and d/c. Pt states he still has pain and it is "difficult to walk to due the pain." Pt has not had a fall since last week. Denies anti-coagulant use. Hx DM type 2.

## 2020-09-02 NOTE — H&P ADULT - ASSESSMENT
88 y.o M with PMH of diabetes presents with mechanical fall in the setting of imaging significant for Paget's disease. 88 y.o M with PMH of diabetes and OA presents with mechanical fall in the setting of OA now admitted to hospital for PT evaluation and disposition for safe discharge 88 y.o M with PMH of diabetes and OA presents with mechanical fall in the setting of OA now admitted to hospital for falls - safe disposition planning.

## 2020-09-02 NOTE — ED ADULT NURSE NOTE - CHPI ED NUR SYMPTOMS NEG
no dizziness/no decreased eating/drinking/no tingling/no weakness/no vomiting/no fever/no chills/no nausea

## 2020-09-02 NOTE — ED ADULT NURSE NOTE - NSIMPLEMENTINTERV_GEN_ALL_ED
Implemented All Fall with Harm Risk Interventions:  [place] to call system. Call bell, personal items and telephone within reach. Instruct patient to call for assistance. Room bathroom lighting operational. Non-slip footwear when patient is off stretcher. Physically safe environment: no spills, clutter or unnecessary equipment. Stretcher in lowest position, wheels locked, appropriate side rails in place. Provide visual cue, wrist band, yellow gown, etc. Monitor gait and stability. Monitor for mental status changes and reorient to person, place, and time. Review medications for side effects contributing to fall risk. Reinforce activity limits and safety measures with patient and family. Provide visual clues: red socks.

## 2020-09-02 NOTE — PATIENT PROFILE ADULT - BRADEN NUTRITION
Bring in completed copy of Advance Directive / 5 Wishes.  1.  Refer to Dr Sharpe for osteoporosis  2.  Left-sided mammography  3.  Obtain vitamin D level and hepatitis C antibody.  4.  Return in one year with annual exam labs prior.  Including CBC, comprehensive metabolic panel, lipid panel, vitamin D   (3) adequate

## 2020-09-02 NOTE — ED PROVIDER NOTE - CLINICAL SUMMARY MEDICAL DECISION MAKING FREE TEXT BOX
89 y/o M / w PMHX of diabetes BIBEMS presents today for lower back and Rt hip pain. Pain management, labs and re-assess. 89 y/o M / w PMHX of diabetes BIBEMS presents today for lower back and Rt hip pain. Pain management, labs and re-assess. Will admit to medicine for Rehab Sarah att: 89 y/o M / w PMHX of diabetes SOFÍA presents today for lower back and Rt hip pain. Pain management, labs and re-assess. Will admit to medicine for Rehab

## 2020-09-02 NOTE — ED PROVIDER NOTE - NS ED ROS FT
Constitutional: (-) fever (-) vomiting  Head: Normal cephalic, Atraumatic  Eyes/ENT: (-) vision changes, (-) hearing changes  Cardiovascular: (-) chest pain, (-) wheezing  Respiratory: (-) cough, (-) shortness of breath  Gastrointestinal: (-) vomiting, (-) diarrhea, (-) abdominal pain  : (-) dysuria   Musculoskeletal: (+) back pain (+) hip pain  Integumentary: (-) rash, (-) edema  Neurological: (-)loc  Allergic/Immunologic: (-) pruritus

## 2020-09-02 NOTE — H&P ADULT - PROBLEM SELECTOR PLAN 5
no clinical signs of Paget's disease, no bone pain, hypercalcemia, recurrent fractures  Plan:   - monitor for now

## 2020-09-02 NOTE — H&P ADULT - PROBLEM SELECTOR PLAN 1
History of falls History of falls, most likely mechanical   Previously discharged on 8/29 from [place] with home PT    Plan:   - fall precautions  - SW consult for placement  - PT evaluation

## 2020-09-02 NOTE — ED ADULT TRIAGE NOTE - CHIEF COMPLAINT QUOTE
Pt arrives with EMS c/o lower back pain. Pt had a mechanical fall 1 week ago, was admitted to [facility] and d/c. Pt states he still has pain and it is "difficult to walk to due the pain." Pt has not had a fall since last week. Denies anti-coagulant use. Hx DM type 2. Pt arrives with EMS c/o lower back pain. Pt had a mechanical fall 1 week ago, no LOC, no head trauma. Was admitted to [facility] and d/c. Pt states he still has pain and it is "difficult to walk to due the pain." Pt has not had a fall since last week. Denies anti-coagulant use. Hx DM type 2.

## 2020-09-02 NOTE — H&P ADULT - NSHPLABSRESULTS_GEN_ALL_CORE
15.5   13.37 )-----------( 260      ( 02 Sep 2020 09:35 )             45.0     Auto Eosinophil # 0.06  / Auto Eosinophil % 0.4   / Auto Neutrophil # 10.21 / Auto Neutrophil % 76.5  / BANDS % x        02    139  |  101  |  36<H>  ----------------------------<  123<H>  4.4   |  24  |  1.35<H>    Ca    9.8      02 Sep 2020 09:35  TPro  7.4  /  Alb  4.1  /  TBili  0.3  /  DBili  x   /  AST  17  /  ALT  34  /  AlkPhos  79  09-02          Urinalysis Basic - ( 02 Sep 2020 13:15 )    Color: LIGHT YELLOW / Appearance: CLEAR / S.019 / pH: 6.0  Gluc: NEGATIVE / Ketone: NEGATIVE  / Bili: NEGATIVE / Urobili: NORMAL   Blood: NEGATIVE / Protein: 50 / Nitrite: NEGATIVE   Leuk Esterase: NEGATIVE / RBC: 0-2 / WBC 0-2   Sq Epi: OCC / Non Sq Epi: x / Bacteria: NEGATIVE      20  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 No acute fx or dislocation.   CT lumbar spine, no acute findings, multilevel lumbar spondylosis.

## 2020-09-02 NOTE — ED PROVIDER NOTE - OBJECTIVE STATEMENT
87 y/o M / w PMHX of diabetes BIBEMS presents today for lower back and Rt hip pain. Admits to  fall at home prompting leg to give out and evaluated at [facility] on 8/26 admitted for Rehab/pain management. Discharged on 8/28, reports fall at home yesterday, states similar to initial episode, walking with cane when Hip pain flared and Rt leg gave out causing him to fall to ground. No dizziness, chest pain, SOB prior to fall. Not on anticoagulants or aspirin. Lives at home with wife. Has Rt foot numbness at baseline. Denies saddle numbness, bladder/bowel dysfunction, fever, cough, headache, visual changes. 89 y/o M / w PMHX of diabetes BIBEMS presents today for lower back and Rt hip pain. Admits to  fall at home, RT leg to gave out while ambulating. Evaluated at [facility] on 8/26 admitted for Rehab/pain management. Discharged on 8/28, reports fall at home yesterday, states similar to initial episode, walking with cane when Hip pain flared and Rt leg gave out causing him to fall to ground. No dizziness, chest pain, SOB prior to fall. Not on anticoagulants or aspirin. Lives at home with wife. Has Rt foot numbness at baseline. Denies head injury, LOC, saddle numbness, bladder/bowel dysfunction, fever, cough, headache, visual changes.

## 2020-09-02 NOTE — H&P ADULT - NSHPPHYSICALEXAM_GEN_ALL_CORE
PHYSICAL EXAM:    Vital Signs Last 24 Hrs  T(C): 36.6 (02 Sep 2020 15:20), Max: 37.1 (02 Sep 2020 09:18)  T(F): 97.8 (02 Sep 2020 15:20), Max: 98.8 (02 Sep 2020 09:18)  HR: 79 (02 Sep 2020 15:20) (72 - 79)  BP: 134/80 (02 Sep 2020 15:20) (132/63 - 150/75)  RR: 16 (02 Sep 2020 15:20) (16 - 18)  SpO2: 97% (02 Sep 2020 15:20) (97% - 100%)    GENERAL: NAD, lying in bed comfortably eating dinner  HEAD:  Atraumatic, Normocephalic  EYES: EOMI, PERRLA, conjunctiva and sclera clear  ENT: Moist mucous membranes  CHEST/LUNG: Clear to auscultation bilaterally; No rales, rhonchi, wheezing, or rubs. Unlabored respirations  HEART: Regular rate and rhythm; No murmurs, rubs, or gallops  ABDOMEN: Bowel sounds present; Soft, Nontender, Nondistended.   EXTREMITIES: (+) trace edema of LE, R>L  2+ Peripheral Pulses, brisk capillary refill.   NERVOUS SYSTEM:  Alert & Oriented X3, speech clear. Gross Sensory of all extremities intact. No loss of sensation of soles of feet b/l. Strength of 5/5 of all extremities.   MSK: Increased pain on external rotation of R hip. No spinal tenderness, no CVA tenderness. FROM of upper extremities b/l, full and equal strength  SKIN: Darkening skin pigmentation b/l of LE PHYSICAL EXAM:    Vital Signs Last 24 Hrs  T(C): 36.6 (02 Sep 2020 15:20), Max: 37.1 (02 Sep 2020 09:18)  T(F): 97.8 (02 Sep 2020 15:20), Max: 98.8 (02 Sep 2020 09:18)  HR: 79 (02 Sep 2020 15:20) (72 - 79)  BP: 134/80 (02 Sep 2020 15:20) (132/63 - 150/75)  RR: 16 (02 Sep 2020 15:20) (16 - 18)  SpO2: 97% (02 Sep 2020 15:20) (97% - 100%)    GENERAL: NAD, lying in bed comfortably eating dinner  HEAD:  Atraumatic, Normocephalic  EYES: EOMI, PERRLA, conjunctiva and sclera clear  ENT: Moist mucous membranes  CHEST/LUNG: Clear to auscultation bilaterally; No rales, rhonchi, wheezing, or rubs. Unlabored respirations  HEART: Regular rate and rhythm; No murmurs, rubs, or gallops  ABDOMEN: Bowel sounds present; Soft, Nontender, Nondistended.   EXTREMITIES: (+) trace edema of LE, R>L  2+ Peripheral Pulses, brisk capillary refill.   NERVOUS SYSTEM:  Alert & Oriented X3, speech clear. Gross Sensory of all extremities intact. No loss of sensation of soles of feet b/l. Strength of 5/5 of all extremities.   MSK: Increased pain on external rotation of R hip. Tenderness of lumbar right back to palpation. FROM of upper extremities b/l, full and equal strength. No paraspinal or vertebral tenderness. Negative leg raise test.   SKIN: Hyperpigmentation skin pigmentation b/l of LE

## 2020-09-02 NOTE — H&P ADULT - HISTORY OF PRESENT ILLNESS
88 y.o M with DM II who was brought in by EMS for lower back and Rt hip pain after mechanical fall at home. He states the fall was from his R leg giving out when walking. Of note he was evaluated at [facility] on 8/26 and admitted for rehab and pain management, he was discharged on 8/29/20. Patient denies LOC, hitting his head, or any bleeding. He is not on ASA or any anticoagulation. Patient has no urinary or bowel dysfunction, no loss of sensation or motor of extremities. Of note patient had two falls prior to [facility] admission. Patient does endorse constipation, nocturnal polyuria,  and numbness of Rt foot. 88 y.o M with DM II who was brought in by EMS for lower back and Rt hip pain after mechanical fall at home. He states the fall was from his R leg giving out when walking. Of note he was evaluated at [facility] on 8/26 and admitted for rehab and pain management, he was discharged on 8/29/20. Patient denies LOC, hitting his head, or any bleeding. He is not on ASA or any anticoagulation. Patient has no urinary or bowel dysfunction, no loss of sensation or motor of extremities. Of note patient had two falls prior to [facility] admission. Patient does endorse constipation, nocturnal polyuria,  and numbness of Rt foot.     In the ED, patient's vitals were WNL. Labs were significant for leukocytosis of 13.37, and elevated BUN 36 and Cr 1.35. Seems like patient's baseline is 1.5. LFTs WNL. U/A significant for protein. EKG significant for RBBB.   Patient was given Tylenol and Lidocaine patch which helped with pain.     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 No acute fx or dislocation.   CT lumbar spine, no acute findings, multilevel lumbar spondylosis. 88 y.o M with DM II who was brought in by EMS for lower back and Rt hip pain after mechanical fall at home. He states the fall was from his R leg giving out when walking. Of note he was evaluated at [facility] on 8/26 and admitted for rehab and pain management, he was discharged on 8/29/20 with home PT. Patient denies LOC, hitting his head, or any bleeding. He is not on ASA or any anticoagulation. Patient has no urinary or bowel dysfunction, no loss of sensation or motor of extremities. Of note patient had two falls prior to [facility] admission and was fully functional and completing all ADLs prior to this.  Patient does endorse constipation, nocturnal polyuria,  and numbness of Rt foot.     In the ED, patient's vitals were WNL. Labs were significant for leukocytosis of 13.37, and elevated BUN 36 and Cr 1.35. Seems like patient's baseline is 1.5. LFTs WNL. U/A significant for protein. EKG significant for RBBB.   Patient was given Tylenol and Lidocaine patch which helped with pain.     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 No acute fx or dislocation.   CT lumbar spine, no acute findings, multilevel lumbar spondylosis.

## 2020-09-02 NOTE — ED PROVIDER NOTE - PHYSICAL EXAMINATION
Vital signs reviewed.   CONSTITUTIONAL: Well-appearing; well-nourished; in no apparent distress. Non-toxic appearing.   HEAD: Normocephalic, atraumatic.  EYES: PERRL, EOM intact, conjunctiva and no sclera injection noted  ENT: normal nose; no rhinorrhea; normal pharynx with no tonsillar hypertrophy, no erythema, no exudate  NECK/LYMPH: Supple; non-tender  CARD: Normal S1, S2  RESP: Normal chest excursion with respiration; breath sounds clear and equal bilaterally  ABD/GI: soft, non-distended; non-tender  EXT/MS: moves all extremities; distal pulses are normal, no pedal edema. Negative SLR B/L. Knee B/L FAROM, non-tender, non-erythematous, non-swollen. RT hip pain w/ adduction  SKIN: Normal for age and race; warm; dry; good turgor; no apparent lesions or exudate noted.  NEURO: Awake, alert, oriented x 3, no gross deficits, CN II-XII grossly intact, no motor or sensory deficit noted.  PSYCH: Normal mood; appropriate affect.

## 2020-09-03 LAB
CULTURE RESULTS: SIGNIFICANT CHANGE UP
SPECIMEN SOURCE: SIGNIFICANT CHANGE UP

## 2020-09-03 PROCEDURE — 99232 SBSQ HOSP IP/OBS MODERATE 35: CPT | Mod: GC

## 2020-09-03 RX ORDER — ACETAMINOPHEN 500 MG
650 TABLET ORAL EVERY 6 HOURS
Refills: 0 | Status: DISCONTINUED | OUTPATIENT
Start: 2020-09-03 | End: 2020-09-03

## 2020-09-03 RX ORDER — ACETAMINOPHEN 500 MG
975 TABLET ORAL EVERY 6 HOURS
Refills: 0 | Status: DISCONTINUED | OUTPATIENT
Start: 2020-09-03 | End: 2020-09-03

## 2020-09-03 RX ORDER — SENNA PLUS 8.6 MG/1
2 TABLET ORAL AT BEDTIME
Refills: 0 | Status: DISCONTINUED | OUTPATIENT
Start: 2020-09-03 | End: 2020-09-11

## 2020-09-03 RX ORDER — ACETAMINOPHEN 500 MG
650 TABLET ORAL EVERY 6 HOURS
Refills: 0 | Status: DISCONTINUED | OUTPATIENT
Start: 2020-09-03 | End: 2020-09-05

## 2020-09-03 RX ORDER — POLYETHYLENE GLYCOL 3350 17 G/17G
17 POWDER, FOR SOLUTION ORAL DAILY
Refills: 0 | Status: DISCONTINUED | OUTPATIENT
Start: 2020-09-03 | End: 2020-09-11

## 2020-09-03 RX ORDER — ACETAMINOPHEN 500 MG
975 TABLET ORAL EVERY 6 HOURS
Refills: 0 | Status: DISCONTINUED | OUTPATIENT
Start: 2020-09-03 | End: 2020-09-05

## 2020-09-03 RX ORDER — LIDOCAINE 4 G/100G
1 CREAM TOPICAL EVERY 24 HOURS
Refills: 0 | Status: DISCONTINUED | OUTPATIENT
Start: 2020-09-03 | End: 2020-09-11

## 2020-09-03 RX ADMIN — HEPARIN SODIUM 5000 UNIT(S): 5000 INJECTION INTRAVENOUS; SUBCUTANEOUS at 05:04

## 2020-09-03 RX ADMIN — LIDOCAINE 1 PATCH: 4 CREAM TOPICAL at 15:25

## 2020-09-03 RX ADMIN — HEPARIN SODIUM 5000 UNIT(S): 5000 INJECTION INTRAVENOUS; SUBCUTANEOUS at 22:00

## 2020-09-03 RX ADMIN — LIDOCAINE 1 PATCH: 4 CREAM TOPICAL at 20:05

## 2020-09-03 RX ADMIN — SENNA PLUS 2 TABLET(S): 8.6 TABLET ORAL at 22:00

## 2020-09-03 RX ADMIN — POLYETHYLENE GLYCOL 3350 17 GRAM(S): 17 POWDER, FOR SOLUTION ORAL at 12:59

## 2020-09-03 RX ADMIN — HEPARIN SODIUM 5000 UNIT(S): 5000 INJECTION INTRAVENOUS; SUBCUTANEOUS at 15:26

## 2020-09-03 NOTE — PROGRESS NOTE ADULT - PROBLEM SELECTOR PLAN 4
SCr 1.35  Patient's baseline seems to be 1.5  Most likely CKD    Plan:   - monitor BMP Finasteride Pregnancy And Lactation Text: This medication is absolutely contraindicated during pregnancy. It is unknown if it is excreted in breast milk.

## 2020-09-03 NOTE — PROGRESS NOTE ADULT - SUBJECTIVE AND OBJECTIVE BOX
Internal Medicine Progress Note    Debora Duke MD PGY-1  Pager: [phone]; [facility] # [phone]; Team 7 Spectra [phone]  Please page [phone] after 7 pm or after 12 pm on weekends    Patient is a 88y old  Male who presents with a chief complaint of fall (02 Sep 2020 15:11)      SUBJECTIVE / OVERNIGHT EVENTS:    MEDICATIONS  (STANDING):  dextrose 5%. 1000 milliLiter(s) (50 mL/Hr) IV Continuous <Continuous>  dextrose 50% Injectable 12.5 Gram(s) IV Push once  dextrose 50% Injectable 25 Gram(s) IV Push once  dextrose 50% Injectable 25 Gram(s) IV Push once  heparin   Injectable 5000 Unit(s) SubCutaneous every 8 hours  influenza   Vaccine 0.5 milliLiter(s) IntraMuscular once  insulin lispro (HumaLOG) corrective regimen sliding scale   SubCutaneous three times a day before meals  insulin lispro (HumaLOG) corrective regimen sliding scale   SubCutaneous at bedtime  polyethylene glycol 3350 17 Gram(s) Oral daily  senna 2 Tablet(s) Oral at bedtime    MEDICATIONS  (PRN):  dextrose 40% Gel 15 Gram(s) Oral once PRN Blood Glucose LESS THAN 70 milliGRAM(s)/deciliter  glucagon  Injectable 1 milliGRAM(s) IntraMuscular once PRN Glucose LESS THAN 70 milligrams/deciliter    Vital Signs Last 24 Hrs  T(C): 36.4 (03 Sep 2020 04:57), Max: 37.1 (02 Sep 2020 09:18)  T(F): 97.5 (03 Sep 2020 04:57), Max: 98.8 (02 Sep 2020 09:18)  HR: 65 (03 Sep 2020 04:57) (65 - 79)  BP: 109/63 (03 Sep 2020 04:57) (109/63 - 150/75)  BP(mean): --  RR: 18 (03 Sep 2020 04:57) (16 - 18)  SpO2: 97% (03 Sep 2020 04:57) (97% - 100%)    CAPILLARY BLOOD GLUCOSE      POCT Blood Glucose.: 165 mg/dL (02 Sep 2020 22:12)  POCT Blood Glucose.: 117 mg/dL (02 Sep 2020 15:27)  POCT Blood Glucose.: 119 mg/dL (02 Sep 2020 09:22)    I&O's Summary    02 Sep 2020 07:01  -  03 Sep 2020 06:22  --------------------------------------------------------  IN: 0 mL / OUT: 175 mL / NET: -175 mL        PHYSICAL EXAM  GENERAL: NAD  HEAD:  Atraumatic  EYES: EOMI, PERRLA, conjunctiva and sclera clear  NECK: Supple, No JVD  CHEST/LUNG: Clear to auscultation bilaterally; No wheeze  HEART: Regular rate and rhythm; No murmurs, rubs, or gallops  ABDOMEN: Soft, Nontender, Nondistended; Bowel sounds present  EXTREMITIES:  2+ Peripheral Pulses, No clubbing, cyanosis, or edema  NEURO/PSYCH: AAOx3, nonfocal  SKIN: No rashes or lesions      LABS:                        15.5   13.37 )-----------( 260      ( 02 Sep 2020 09:35 )             45.0     Hemoglobin: 15.5 g/dL ( @ 09:35)    CBC Full  -  ( 02 Sep 2020 09:35 )  WBC Count : 13.37 K/uL  RBC Count : 4.73 M/uL  Hemoglobin : 15.5 g/dL  Hematocrit : 45.0 %  Platelet Count - Automated : 260 K/uL  Mean Cell Volume : 95.1 fL  Mean Cell Hemoglobin : 32.8 pg  Mean Cell Hemoglobin Concentration : 34.4 %  Auto Neutrophil # : 10.21 K/uL  Auto Lymphocyte # : 1.97 K/uL  Auto Monocyte # : 0.90 K/uL  Auto Eosinophil # : 0.06 K/uL  Auto Basophil # : 0.03 K/uL  Auto Neutrophil % : 76.5 %  Auto Lymphocyte % : 14.7 %  Auto Monocyte % : 6.7 %  Auto Eosinophil % : 0.4 %  Auto Basophil % : 0.2 %        139  |  101  |  36<H>  ----------------------------<  123<H>  4.4   |  24  |  1.35<H>    Ca    9.8      02 Sep 2020 09:35    TPro  7.4  /  Alb  4.1  /  TBili  0.3  /  DBili  x   /  AST  17  /  ALT  34  /  AlkPhos  79  09-02    Creatinine Trend: 1.35<--, 1.59<--, 1.40<--  LIVER FUNCTIONS - ( 02 Sep 2020 09:35 )  Alb: 4.1 g/dL / Pro: 7.4 g/dL / ALK PHOS: 79 u/L / ALT: 34 u/L / AST: 17 u/L / GGT: x               hs Troponin:            Urinalysis Basic - ( 02 Sep 2020 13:15 )    Color: LIGHT YELLOW / Appearance: CLEAR / S.019 / pH: 6.0  Gluc: NEGATIVE / Ketone: NEGATIVE  / Bili: NEGATIVE / Urobili: NORMAL   Blood: NEGATIVE / Protein: 50 / Nitrite: NEGATIVE   Leuk Esterase: NEGATIVE / RBC: 0-2 / WBC 0-2   Sq Epi: OCC / Non Sq Epi: x / Bacteria: NEGATIVE      CSF:                      EKG:   MICROBIOLOGY:    IMAGING:      Labs, imaging, EKG personally reviewed Internal Medicine Progress Note    Debora Duke MD PGY-1  Pager: [phone]; [facility] # [phone]; Team 4 Spectra [phone]  Please page [phone] after 7 pm or after 12 pm on weekends    Patient is a 88y old  Male who presents with a chief complaint of fall (02 Sep 2020 15:11)      SUBJECTIVE / OVERNIGHT EVENTS: At bedside, pt is eating breakfast, tolerating PO well, no n/v, CP, SOB, LE pain, abd pain, d/c, dysuria. Pt points to lateral aspect of right hip, stating he feels pain there when moving, but not when lying still.     MEDICATIONS  (STANDING):  dextrose 5%. 1000 milliLiter(s) (50 mL/Hr) IV Continuous <Continuous>  dextrose 50% Injectable 12.5 Gram(s) IV Push once  dextrose 50% Injectable 25 Gram(s) IV Push once  dextrose 50% Injectable 25 Gram(s) IV Push once  heparin   Injectable 5000 Unit(s) SubCutaneous every 8 hours  influenza   Vaccine 0.5 milliLiter(s) IntraMuscular once  insulin lispro (HumaLOG) corrective regimen sliding scale   SubCutaneous three times a day before meals  insulin lispro (HumaLOG) corrective regimen sliding scale   SubCutaneous at bedtime  polyethylene glycol 3350 17 Gram(s) Oral daily  senna 2 Tablet(s) Oral at bedtime    MEDICATIONS  (PRN):  dextrose 40% Gel 15 Gram(s) Oral once PRN Blood Glucose LESS THAN 70 milliGRAM(s)/deciliter  glucagon  Injectable 1 milliGRAM(s) IntraMuscular once PRN Glucose LESS THAN 70 milligrams/deciliter    Vital Signs Last 24 Hrs  T(C): 36.4 (03 Sep 2020 04:57), Max: 37.1 (02 Sep 2020 09:18)  T(F): 97.5 (03 Sep 2020 04:57), Max: 98.8 (02 Sep 2020 09:18)  HR: 65 (03 Sep 2020 04:57) (65 - 79)  BP: 109/63 (03 Sep 2020 04:57) (109/63 - 150/75)  BP(mean): --  RR: 18 (03 Sep 2020 04:57) (16 - 18)  SpO2: 97% (03 Sep 2020 04:57) (97% - 100%)    CAPILLARY BLOOD GLUCOSE      POCT Blood Glucose.: 165 mg/dL (02 Sep 2020 22:12)  POCT Blood Glucose.: 117 mg/dL (02 Sep 2020 15:27)  POCT Blood Glucose.: 119 mg/dL (02 Sep 2020 09:22)    I&O's Summary    02 Sep 2020 07:01  -  03 Sep 2020 06:22  --------------------------------------------------------  IN: 0 mL / OUT: 175 mL / NET: -175 mL        PHYSICAL EXAM  GENERAL: NAD  HEAD:  Atraumatic  EYES: EOMI, PERRLA, conjunctiva and sclera clear  NECK: Supple, No JVD  CHEST/LUNG: Clear to auscultation bilaterally; No wheeze  HEART: Regular rate and rhythm; No murmurs, rubs, or gallops  ABDOMEN: obese, Soft, Nontender, Nondistended; Bowel sounds present  EXTREMITIES:  2+ Peripheral Pulses, No clubbing, cyanosis, or edema  NEURO/PSYCH: AAOx4, nonfocal; gross Sensory of all extremities intact. No loss of sensation of soles of feet b/l. Strength of 5/5 of all extremities.   MSK: Increased pain on external rotation of R hip. FROM of upper extremities b/l, full and equal strength. No paraspinal or vertebral tenderness. Negative leg raise test.   SKIN: hyperpigmented macules on soles of feet b/l      LABS:                        15.5   13.37 )-----------( 260      ( 02 Sep 2020 09:35 )             45.0     Hemoglobin: 15.5 g/dL ( @ 09:35)    CBC Full  -  ( 02 Sep 2020 09:35 )  WBC Count : 13.37 K/uL  RBC Count : 4.73 M/uL  Hemoglobin : 15.5 g/dL  Hematocrit : 45.0 %  Platelet Count - Automated : 260 K/uL  Mean Cell Volume : 95.1 fL  Mean Cell Hemoglobin : 32.8 pg  Mean Cell Hemoglobin Concentration : 34.4 %  Auto Neutrophil # : 10.21 K/uL  Auto Lymphocyte # : 1.97 K/uL  Auto Monocyte # : 0.90 K/uL  Auto Eosinophil # : 0.06 K/uL  Auto Basophil # : 0.03 K/uL  Auto Neutrophil % : 76.5 %  Auto Lymphocyte % : 14.7 %  Auto Monocyte % : 6.7 %  Auto Eosinophil % : 0.4 %  Auto Basophil % : 0.2 %        139  |  101  |  36<H>  ----------------------------<  123<H>  4.4   |  24  |  1.35<H>    Ca    9.8      02 Sep 2020 09:35    TPro  7.4  /  Alb  4.1  /  TBili  0.3  /  DBili  x   /  AST  17  /  ALT  34  /  AlkPhos  79      Creatinine Trend: 1.35<--, 1.59<--, 1.40<--  LIVER FUNCTIONS - ( 02 Sep 2020 09:35 )  Alb: 4.1 g/dL / Pro: 7.4 g/dL / ALK PHOS: 79 u/L / ALT: 34 u/L / AST: 17 u/L / GGT: x               Urinalysis Basic - ( 02 Sep 2020 13:15 )    Color: LIGHT YELLOW / Appearance: CLEAR / S.019 / pH: 6.0  Gluc: NEGATIVE / Ketone: NEGATIVE  / Bili: NEGATIVE / Urobili: NORMAL   Blood: NEGATIVE / Protein: 50 / Nitrite: NEGATIVE   Leuk Esterase: NEGATIVE / RBC: 0-2 / WBC 0-2   Sq Epi: OCC / Non Sq Epi: x / Bacteria: NEGATIVE

## 2020-09-03 NOTE — PROGRESS NOTE ADULT - PROBLEM SELECTOR PLAN 3
Leukocytosis 13.37  Most likely reactive from fall  U/A negative, afebrile, less likely infectious    Plan:   - Monitor CBC  - CXR Leukocytosis 13.37  Most likely reactive from fall  U/A negative, afebrile, less likely infectious    Plan:   - Monitor CBC  - CXR from 9/2 neg

## 2020-09-03 NOTE — PHYSICAL THERAPY INITIAL EVALUATION ADULT - PERTINENT HX OF CURRENT PROBLEM, REHAB EVAL
Pt. admitted for lower back and right hip pain after mechanical fall at home. Per radiology reports, CT pelvis revealed no acute fractures or dislocations. 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 CT lumbar spine revealed no acute findings.

## 2020-09-03 NOTE — PHYSICAL THERAPY INITIAL EVALUATION ADULT - ADDITIONAL COMMENTS
Pt. reports owning a rolling walker. Pt. states he fell prior to admission due to right LE "giving out".     Pt. was left seated in chair post PT Evaluation, no apparent distress, all lines intact, call martinez within reach. KENYA French made aware of pt. status.

## 2020-09-03 NOTE — PROGRESS NOTE ADULT - PROBLEM SELECTOR PLAN 1
History of falls, most likely mechanical   Previously discharged on 8/29 from [place] with home PT    Plan:   - fall precautions  - SW consult for placement  - PT evaluation History of falls, most likely mechanical   Previously discharged on 8/29 from [place] with home PT    Plan:   - fall precautions  - SW consult for placement  - PT evaluation rec Inpatient restorative rehabilitation.

## 2020-09-04 ENCOUNTER — TRANSCRIPTION ENCOUNTER (OUTPATIENT)
Age: 85
End: 2020-09-04

## 2020-09-04 DIAGNOSIS — I87.8 OTHER SPECIFIED DISORDERS OF VEINS: ICD-10-CM

## 2020-09-04 LAB
ANION GAP SERPL CALC-SCNC: 16 MMO/L — HIGH (ref 7–14)
BUN SERPL-MCNC: 37 MG/DL — HIGH (ref 7–23)
CALCIUM SERPL-MCNC: 9.3 MG/DL — SIGNIFICANT CHANGE UP (ref 8.4–10.5)
CHLORIDE SERPL-SCNC: 102 MMOL/L — SIGNIFICANT CHANGE UP (ref 98–107)
CO2 SERPL-SCNC: 18 MMOL/L — LOW (ref 22–31)
CREAT SERPL-MCNC: 1.29 MG/DL — SIGNIFICANT CHANGE UP (ref 0.5–1.3)
GLUCOSE SERPL-MCNC: 140 MG/DL — HIGH (ref 70–99)
HCT VFR BLD CALC: 48.3 % — SIGNIFICANT CHANGE UP (ref 39–50)
HGB BLD-MCNC: 15 G/DL — SIGNIFICANT CHANGE UP (ref 13–17)
MAGNESIUM SERPL-MCNC: 1.9 MG/DL — SIGNIFICANT CHANGE UP (ref 1.6–2.6)
MCHC RBC-ENTMCNC: 31.1 % — LOW (ref 32–36)
MCHC RBC-ENTMCNC: 31.3 PG — SIGNIFICANT CHANGE UP (ref 27–34)
MCV RBC AUTO: 100.6 FL — HIGH (ref 80–100)
NRBC # FLD: 0 K/UL — SIGNIFICANT CHANGE UP (ref 0–0)
PHOSPHATE SERPL-MCNC: 3.3 MG/DL — SIGNIFICANT CHANGE UP (ref 2.5–4.5)
PLATELET # BLD AUTO: 211 K/UL — SIGNIFICANT CHANGE UP (ref 150–400)
PMV BLD: 9.6 FL — SIGNIFICANT CHANGE UP (ref 7–13)
POTASSIUM SERPL-MCNC: 4.7 MMOL/L — SIGNIFICANT CHANGE UP (ref 3.5–5.3)
POTASSIUM SERPL-SCNC: 4.7 MMOL/L — SIGNIFICANT CHANGE UP (ref 3.5–5.3)
RBC # BLD: 4.8 M/UL — SIGNIFICANT CHANGE UP (ref 4.2–5.8)
RBC # FLD: 13.1 % — SIGNIFICANT CHANGE UP (ref 10.3–14.5)
SODIUM SERPL-SCNC: 136 MMOL/L — SIGNIFICANT CHANGE UP (ref 135–145)
WBC # BLD: 10.84 K/UL — HIGH (ref 3.8–10.5)
WBC # FLD AUTO: 10.84 K/UL — HIGH (ref 3.8–10.5)

## 2020-09-04 PROCEDURE — 99233 SBSQ HOSP IP/OBS HIGH 50: CPT | Mod: GC

## 2020-09-04 RX ORDER — SENNA PLUS 8.6 MG/1
2 TABLET ORAL
Qty: 0 | Refills: 0 | DISCHARGE
Start: 2020-09-04

## 2020-09-04 RX ORDER — POLYETHYLENE GLYCOL 3350 17 G/17G
17 POWDER, FOR SOLUTION ORAL
Qty: 0 | Refills: 0 | DISCHARGE
Start: 2020-09-04

## 2020-09-04 RX ORDER — ACETAMINOPHEN 500 MG
2 TABLET ORAL
Qty: 0 | Refills: 0 | DISCHARGE
Start: 2020-09-04

## 2020-09-04 RX ORDER — LIDOCAINE 4 G/100G
1 CREAM TOPICAL
Qty: 0 | Refills: 0 | DISCHARGE
Start: 2020-09-04

## 2020-09-04 RX ADMIN — LIDOCAINE 1 PATCH: 4 CREAM TOPICAL at 14:25

## 2020-09-04 RX ADMIN — LIDOCAINE 1 PATCH: 4 CREAM TOPICAL at 19:07

## 2020-09-04 RX ADMIN — HEPARIN SODIUM 5000 UNIT(S): 5000 INJECTION INTRAVENOUS; SUBCUTANEOUS at 22:07

## 2020-09-04 RX ADMIN — POLYETHYLENE GLYCOL 3350 17 GRAM(S): 17 POWDER, FOR SOLUTION ORAL at 14:24

## 2020-09-04 RX ADMIN — HEPARIN SODIUM 5000 UNIT(S): 5000 INJECTION INTRAVENOUS; SUBCUTANEOUS at 14:24

## 2020-09-04 RX ADMIN — SENNA PLUS 2 TABLET(S): 8.6 TABLET ORAL at 22:07

## 2020-09-04 RX ADMIN — HEPARIN SODIUM 5000 UNIT(S): 5000 INJECTION INTRAVENOUS; SUBCUTANEOUS at 05:25

## 2020-09-04 RX ADMIN — LIDOCAINE 1 PATCH: 4 CREAM TOPICAL at 04:00

## 2020-09-04 NOTE — PROGRESS NOTE ADULT - SUBJECTIVE AND OBJECTIVE BOX
Kamala Miranda MD  PGY 1 Department of Internal Medicine  Pager: [phone] ([facility])       Patient is a 88y old  Male who presents with a chief complaint of fall (03 Sep 2020 06:20)      SUBJECTIVE / OVERNIGHT EVENTS: Pt seen and examined. No acute overnight events. PT saw patient yesterday, recommended inpatient restorative rehab.  Denies fevers, chills, CP, SOB, Abdominal pain, N/V, Constipation, Diarrhea        MEDICATIONS  (STANDING):  dextrose 5%. 1000 milliLiter(s) (50 mL/Hr) IV Continuous <Continuous>  dextrose 50% Injectable 12.5 Gram(s) IV Push once  dextrose 50% Injectable 25 Gram(s) IV Push once  dextrose 50% Injectable 25 Gram(s) IV Push once  heparin   Injectable 5000 Unit(s) SubCutaneous every 8 hours  influenza   Vaccine 0.5 milliLiter(s) IntraMuscular once  insulin lispro (HumaLOG) corrective regimen sliding scale   SubCutaneous three times a day before meals  insulin lispro (HumaLOG) corrective regimen sliding scale   SubCutaneous at bedtime  lidocaine   Patch 1 Patch Transdermal every 24 hours  polyethylene glycol 3350 17 Gram(s) Oral daily  senna 2 Tablet(s) Oral at bedtime    MEDICATIONS  (PRN):  acetaminophen   Tablet .. 650 milliGRAM(s) Oral every 6 hours PRN Moderate Pain (4 - 6)  acetaminophen   Tablet .. 975 milliGRAM(s) Oral every 6 hours PRN Severe Pain (7 - 10)  dextrose 40% Gel 15 Gram(s) Oral once PRN Blood Glucose LESS THAN 70 milliGRAM(s)/deciliter  glucagon  Injectable 1 milliGRAM(s) IntraMuscular once PRN Glucose LESS THAN 70 milligrams/deciliter      I&O's Summary    03 Sep 2020 07:01  -  04 Sep 2020 07:00  --------------------------------------------------------  IN: 0 mL / OUT: 400 mL / NET: -400 mL    CAPILLARY BLOOD GLUCOSE      POCT Blood Glucose.: 122 mg/dL (03 Sep 2020 21:46)  POCT Blood Glucose.: 103 mg/dL (03 Sep 2020 17:30)  POCT Blood Glucose.: 100 mg/dL (03 Sep 2020 12:05)  POCT Blood Glucose.: 114 mg/dL (03 Sep 2020 08:21)      PHYSICAL EXAM:  Vital Signs Last 24 Hrs  T(C): 36.7 (04 Sep 2020 05:26), Max: 36.7 (03 Sep 2020 13:30)  T(F): 98.1 (04 Sep 2020 05:26), Max: 98.1 (04 Sep 2020 05:26)  HR: 65 (04 Sep 2020 05:26) (65 - 71)  BP: 106/69 (04 Sep 2020 05:26) (106/60 - 119/56)  BP(mean): --  RR: 17 (04 Sep 2020 05:26) (17 - 19)  SpO2: 98% (04 Sep 2020 05:26) (96% - 100%)    	GENERAL: NAD, lying in bed comfortably eating dinner  	HEAD:  Atraumatic, Normocephalic  	EYES: EOMI, PERRLA, conjunctiva and sclera clear  	ENT: Moist mucous membranes  	CHEST/LUNG: Clear to auscultation bilaterally; No rales, rhonchi, wheezing, or rubs. Unlabored respirations  	HEART: Regular rate and rhythm; No murmurs, rubs, or gallops  	ABDOMEN: Bowel sounds present; Soft, Nontender, Nondistended.   	EXTREMITIES: (+) trace edema of LE, R>L  2+ Peripheral Pulses, brisk capillary refill.   	NERVOUS SYSTEM:  Alert & Oriented X3, speech clear. Gross Sensory of all extremities intact. No loss of sensation of soles of feet b/l. Strength of 5/5 of all extremities.   	MSK: Increased pain on external rotation of R hip. Tenderness of lumbar right back to palpation. FROM of upper extremities b/l, full and equal strength. No paraspinal or vertebral tenderness. Negative leg raise test.   SKIN: Hyperpigmentation skin pigmentation b/l of LE       LABS:                        15.0   10.84 )-----------( 211      ( 04 Sep 2020 05:30 )             48.3     Auto Eosinophil # x     / Auto Eosinophil % x     / Auto Neutrophil # x     / Auto Neutrophil % x     / BANDS % x                            15.5   13.37 )-----------( 260      ( 02 Sep 2020 09:35 )             45.0     Auto Eosinophil # 0.06  / Auto Eosinophil % 0.4   / Auto Neutrophil # 10.21 / Auto Neutrophil % 76.5  / BANDS % x            136  |  102  |  37<H>  ----------------------------<  140<H>  4.7   |  18<L>  |  1.29      139  |  101  |  36<H>  ----------------------------<  123<H>  4.4   |  24  |  1.35<H>    Ca    9.3      04 Sep 2020 05:30  Mg     1.9       Phos  3.3       TPro  7.4  /  Alb  4.1  /  TBili  0.3  /  DBili  x   /  AST  17  /  ALT  34  /  AlkPhos  79  09-          Urinalysis Basic - ( 02 Sep 2020 13:15 )    Color: LIGHT YELLOW / Appearance: CLEAR / S.019 / pH: 6.0  Gluc: NEGATIVE / Ketone: NEGATIVE  / Bili: NEGATIVE / Urobili: NORMAL   Blood: NEGATIVE / Protein: 50 / Nitrite: NEGATIVE   Leuk Esterase: NEGATIVE / RBC: 0-2 / WBC 0-2   Sq Epi: OCC / Non Sq Epi: x / Bacteria: NEGATIVE

## 2020-09-04 NOTE — DISCHARGE NOTE PROVIDER - NSDCMRMEDTOKEN_GEN_ALL_CORE_FT
cholecalciferol oral tablet: 2000 unit(s) orally once a day  ClearLax oral powder for reconstitution: 17 gram(s) orally once a day  Januvia 100 mg oral tablet: 1 tab(s) orally once a day  lidocaine 5% topical film: Apply topically to affected area once a day  senna oral tablet: 2 tab(s) orally once a day (at bedtime)  Tylenol 325 mg oral tablet: 2 tab(s) orally every 6 hours, As needed, Moderate Pain (4 - 6) cholecalciferol oral tablet: 2000 unit(s) orally once a day  ClearLax oral powder for reconstitution: 17 gram(s) orally once a day  gabapentin 100 mg oral capsule: 1 cap(s) orally 3 times a day  Januvia 100 mg oral tablet: 1 tab(s) orally once a day  lidocaine 5% topical film: Apply topically to affected area once a day  senna oral tablet: 2 tab(s) orally once a day (at bedtime)  Tylenol 325 mg oral tablet: 3 tab(s) orally every 8 hours acetaminophen 500 mg oral capsule: 2 cap(s) orally every 6 hours as needed for pain    [facility]  cholecalciferol oral tablet: 2000 unit(s) orally once a day  ClearLax oral powder for reconstitution: 17 gram(s) orally once a day [facility]  ClearLax oral powder for reconstitution: 17 gram(s) orally once a day  gabapentin 100 mg oral capsule: 1 cap(s) orally 3 times a day     MDD[facility]300[facility]  Januvia 100 mg oral tablet: 1 tab(s) orally once a day  lidocaine 5% topical film: Apply topically to affected area once a day MDD:[facility] 9N 908B  lidocaine 5% topical film: Apply topically to affected area once a day  senna oral tablet: 2 tab(s) orally once a day (at bedtime)  senna oral tablet: 2 tab(s) orally once a day (at bedtime) [facility] acetaminophen 500 mg oral capsule: 2 cap(s) orally every 6 hours as needed for pain    [facility]  cholecalciferol oral tablet: 2000 unit(s) orally once a day  ClearLax oral powder for reconstitution: 17 gram(s) orally once a day [facility]  ClearLax oral powder for reconstitution: 17 gram(s) orally once a day  gabapentin 100 mg oral capsule: 1 cap(s) orally 3 times a day     [facility]  Januvia 100 mg oral tablet: 1 tab(s) orally once a day  lidocaine 5% topical film: Apply topically to affected area once a day MDD:[facility] 9N 908B  lidocaine 5% topical film: Apply topically to affected area once a day  senna oral tablet: 2 tab(s) orally once a day (at bedtime)  senna oral tablet: 2 tab(s) orally once a day (at bedtime) [facility] acetaminophen 500 mg oral capsule: 2 cap(s) orally every 6 hours as needed for pain    [facility] mg  cholecalciferol oral tablet: 2000 unit(s) orally once a day  ClearLax oral powder for reconstitution: 17 gram(s) orally once a day [facility]  ClearLax oral powder for reconstitution: 17 gram(s) orally once a day  gabapentin 100 mg oral capsule: 1 cap(s) orally 3 times a day     MDD[facility]  Januvia 100 mg oral tablet: 1 tab(s) orally once a day  lidocaine 5% topical film: Apply topically to affected area once a day MDD:[facility] 9N 908B  lidocaine 5% topical film: Apply topically to affected area once a day  senna oral tablet: 2 tab(s) orally once a day (at bedtime)  senna oral tablet: 2 tab(s) orally once a day (at bedtime) [facility] acetaminophen 500 mg oral capsule: 2 cap(s) orally every 6 hours as needed for pain    [facility]  cholecalciferol oral tablet: 2000 unit(s) orally once a day  ClearLax oral powder for reconstitution: 17 gram(s) orally once a day [facility]  ClearLax oral powder for reconstitution: 17 gram(s) orally once a day  gabapentin 100 mg oral capsule: 1 cap(s) orally 3 times a day     [facility]  Januvia 100 mg oral tablet: 1 tab(s) orally once a day  lidocaine 5% topical film: Apply topically to affected area once a day MDD:[facility] 9N 908B  lidocaine 5% topical film: Apply topically to affected area once a day  senna oral tablet: 2 tab(s) orally once a day (at bedtime)  senna oral tablet: 2 tab(s) orally once a day (at bedtime) [facility]

## 2020-09-04 NOTE — PROGRESS NOTE ADULT - PROBLEM SELECTOR PLAN 5
b/l venous stasis hyperpigmentation on LE  Plan:   - advised patient on leg elevation  - will discharge on compression stockings

## 2020-09-04 NOTE — PROGRESS NOTE ADULT - PROBLEM SELECTOR PLAN 1
History of falls, most likely mechanical   Previously discharged on 8/29 from [place] with home PT    Plan:   - fall precautions  - SW consult for placement  - PT evaluation rec Inpatient restorative rehabilitation.

## 2020-09-04 NOTE — DISCHARGE NOTE PROVIDER - NSDCCPCAREPLAN_GEN_ALL_CORE_FT
PRINCIPAL DISCHARGE DIAGNOSIS  Diagnosis: Fall  Assessment and Plan of Treatment: You were admitted to the hospital because you had a fall. We think your fall is mechanical, not due to any neurological or cardiac issue. We had PT evaluate you, and they recommended that you be discharged to rehab. Therefore we are discharging you to rehab so you can regain your strength and be discharged home safely.      SECONDARY DISCHARGE DIAGNOSES  Diagnosis: Venous stasis  Assessment and Plan of Treatment: You have venous stasis skin darkening in your lower extremities. Please keep your legs elevated when you can. please wear compression stockings when possible.    Diagnosis: Diabetes mellitus  Assessment and Plan of Treatment: You have diabetes, and are on Januvia for it. Please continue taking Januvia while you are in rehab and follow -up with your primary care doctor once you go home. PRINCIPAL DISCHARGE DIAGNOSIS  Diagnosis: Fall  Assessment and Plan of Treatment: You were admitted to the hospital because you had a fall. We think your fall is mechanical, not due to any neurological or cardiac issue. We had PT evaluate you, and they recommended that you be discharged to rehab. Therefore we are discharging you to rehab so you can regain your strength and be discharged home safely.      SECONDARY DISCHARGE DIAGNOSES  Diagnosis: Venous stasis  Assessment and Plan of Treatment: You have venous stasis skin darkening in your lower extremities. Please keep your legs elevated when you can. please wear compression stockings when possible.    Diagnosis: Diabetes mellitus  Assessment and Plan of Treatment: You have diabetes, and are on Januvia for it. Please continue taking Januvia while you are in rehab and follow -up with your primary care doctor once you go home. We are also prescribing you Gabapentin for possible neuropathic leg pain from your diabetes.

## 2020-09-04 NOTE — DISCHARGE NOTE PROVIDER - HOSPITAL COURSE
88 y.o M with DM II who was brought in by EMS for lower back and Rt hip pain after mechanical fall at home. He states the fall was from his R leg giving out when walking. Patient denies LOC, hitting his head, or any bleeding. He is not on ASA or any anticoagulation. Patient has no urinary or bowel dysfunction, no loss of sensation or motor of extremities.    Of note patient was evaluated at [facility] on 8/26 and admitted for rehab and pain management, he was discharged on 8/29/20 with home PT.          In the ED, patient's vitals were WNL. 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 No acute fx or dislocation. CT lumbar spine, no acute findings, multilevel lumbar spondylosis. Labs were significant for leukocytosis of 13.37 but downtrending during hospitalization, no signs of infection, CXR and U/A was negative, most likely secondary to fall.  Tylenol and Lidocaine patch controlled patient's pain.         Patient had PT evaluation who recommended discharge to inpatient restorative rehab. Patient is hemodynamically stable for discharge to rehab. Patient does have signs of venous stasis in lower extremity, patient advised to wear compression stockings. 88 y.o M with DM II who was brought in by EMS for lower back and Rt hip pain after mechanical fall at home. He states the fall was from his R leg giving out when walking. Patient denies LOC, hitting his head, or any bleeding. He is not on ASA or any anticoagulation. Patient has no urinary or bowel dysfunction, no loss of sensation or motor of extremities.    Of note patient was evaluated at [facility] on 8/26 and admitted for rehab and pain management, he was discharged on 8/29/20 with home PT.          In the ED, patient's vitals were WNL. 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 No clinical signs of Paget's disease.  No acute fx or dislocation. CT lumbar spine, no acute findings, multilevel lumbar spondylosis. Labs were significant for leukocytosis of 13.37 but downtrending during hospitalization, no signs of infection, CXR and U/A was negative, leukocytosis most likely reactive.  Tylenol and Lidocaine patch controlled patient's pain barring one dose of oxycodone 5 mg.  Patient was complaining of R him, LE pain, he was started on Gabapentin for possible diabetic neuropathy.         Patient had PT evaluation who recommended discharge to inpatient restorative rehab. Patient is hemodynamically stable for discharge to rehab. Patient does have signs of venous stasis in lower extremity, patient advised to wear compression stockings.

## 2020-09-04 NOTE — DISCHARGE NOTE PROVIDER - CARE PROVIDER_API CALL
Maximilian Hammer  INTERNAL MEDICINE  [address] [address] [place] [address]  Phone: [phone]  Fax: [phone]  Follow Up Time:

## 2020-09-04 NOTE — PROGRESS NOTE ADULT - PROBLEM SELECTOR PLAN 3
Leukocytosis 13.37  Most likely reactive from fall  U/A negative, afebrile,  CXR N,  less likely infectious    Plan:   - Monitor CBC  Downtrending

## 2020-09-04 NOTE — DISCHARGE NOTE PROVIDER - INSTRUCTIONS
Please try to keep a low carbohydrate, low fat, low salt diet. Avoid breads, rice, fried foods, and beef. Try to have vegetables, lean meats like chicken, and boiled and grilled foods.

## 2020-09-05 PROCEDURE — 99232 SBSQ HOSP IP/OBS MODERATE 35: CPT | Mod: GC

## 2020-09-05 RX ORDER — ACETAMINOPHEN 500 MG
975 TABLET ORAL EVERY 8 HOURS
Refills: 0 | Status: DISCONTINUED | OUTPATIENT
Start: 2020-09-05 | End: 2020-09-11

## 2020-09-05 RX ORDER — ACETAMINOPHEN 500 MG
975 TABLET ORAL EVERY 8 HOURS
Refills: 0 | Status: DISCONTINUED | OUTPATIENT
Start: 2020-09-05 | End: 2020-09-05

## 2020-09-05 RX ADMIN — Medication 10 MILLIGRAM(S): at 22:05

## 2020-09-05 RX ADMIN — LIDOCAINE 1 PATCH: 4 CREAM TOPICAL at 19:00

## 2020-09-05 RX ADMIN — HEPARIN SODIUM 5000 UNIT(S): 5000 INJECTION INTRAVENOUS; SUBCUTANEOUS at 05:31

## 2020-09-05 RX ADMIN — LIDOCAINE 1 PATCH: 4 CREAM TOPICAL at 13:08

## 2020-09-05 RX ADMIN — SENNA PLUS 2 TABLET(S): 8.6 TABLET ORAL at 22:05

## 2020-09-05 RX ADMIN — POLYETHYLENE GLYCOL 3350 17 GRAM(S): 17 POWDER, FOR SOLUTION ORAL at 11:57

## 2020-09-05 RX ADMIN — HEPARIN SODIUM 5000 UNIT(S): 5000 INJECTION INTRAVENOUS; SUBCUTANEOUS at 22:05

## 2020-09-05 RX ADMIN — Medication 975 MILLIGRAM(S): at 22:05

## 2020-09-05 RX ADMIN — LIDOCAINE 1 PATCH: 4 CREAM TOPICAL at 02:41

## 2020-09-05 RX ADMIN — Medication 975 MILLIGRAM(S): at 16:55

## 2020-09-05 RX ADMIN — HEPARIN SODIUM 5000 UNIT(S): 5000 INJECTION INTRAVENOUS; SUBCUTANEOUS at 13:09

## 2020-09-05 NOTE — PROGRESS NOTE ADULT - PROBLEM SELECTOR PLAN 1
History of falls, most likely mechanical   Previously discharged on 8/29 from [facility] with home PT    Plan:   - fall precautions  - SW consult for placement--planned for dc on tuesday  - PT evaluation rec Inpatient restorative rehabilitation. History of falls, most likely mechanical   Previously discharged on 8/29 from [place] with home PT    Plan:   - fall precautions  -  consult for placement--planned for dc on tuesday  - PT evaluation rec Inpatient restorative rehabilitation.  - standing tylenol 975 q8 for pain

## 2020-09-05 NOTE — PROGRESS NOTE ADULT - SUBJECTIVE AND OBJECTIVE BOX
PROGRESS NOTE:     CONTACT INFO:   Rob Urena (Xiao)   NS: [phone]/J: [id number]  PGY-2    Patient is a 88y old  Male who presents with a chief complaint of fall (04 Sep 2020 10:46)      SUBJECTIVE / OVERNIGHT EVENTS: no acute event overnight.     ADDITIONAL REVIEW OF SYSTEMS: reports having hip pain when moves around. Tylenol helps. Denies fever, chills, CP, SOB    MEDICATIONS  (STANDING):  dextrose 5%. 1000 milliLiter(s) (50 mL/Hr) IV Continuous <Continuous>  dextrose 50% Injectable 12.5 Gram(s) IV Push once  dextrose 50% Injectable 25 Gram(s) IV Push once  dextrose 50% Injectable 25 Gram(s) IV Push once  heparin   Injectable 5000 Unit(s) SubCutaneous every 8 hours  influenza   Vaccine 0.5 milliLiter(s) IntraMuscular once  insulin lispro (HumaLOG) corrective regimen sliding scale   SubCutaneous three times a day before meals  insulin lispro (HumaLOG) corrective regimen sliding scale   SubCutaneous at bedtime  lidocaine   Patch 1 Patch Transdermal every 24 hours  polyethylene glycol 3350 17 Gram(s) Oral daily  senna 2 Tablet(s) Oral at bedtime    MEDICATIONS  (PRN):  acetaminophen   Tablet .. 650 milliGRAM(s) Oral every 6 hours PRN Moderate Pain (4 - 6)  acetaminophen   Tablet .. 975 milliGRAM(s) Oral every 6 hours PRN Severe Pain (7 - 10)  dextrose 40% Gel 15 Gram(s) Oral once PRN Blood Glucose LESS THAN 70 milliGRAM(s)/deciliter  glucagon  Injectable 1 milliGRAM(s) IntraMuscular once PRN Glucose LESS THAN 70 milligrams/deciliter      CAPILLARY BLOOD GLUCOSE      POCT Blood Glucose.: 122 mg/dL (05 Sep 2020 08:23)  POCT Blood Glucose.: 120 mg/dL (04 Sep 2020 21:39)  POCT Blood Glucose.: 92 mg/dL (04 Sep 2020 17:21)  POCT Blood Glucose.: 92 mg/dL (04 Sep 2020 12:06)    I&O's Summary      PHYSICAL EXAM:  Vital Signs Last 24 Hrs  T(C): 36.6 (05 Sep 2020 05:27), Max: 36.8 (04 Sep 2020 13:21)  T(F): 97.8 (05 Sep 2020 05:27), Max: 98.2 (04 Sep 2020 13:21)  HR: 80 (05 Sep 2020 05:27) (69 - 80)  BP: 138/92 (05 Sep 2020 05:27) (103/66 - 138/92)  BP(mean): --  RR: 17 (05 Sep 2020 05:27) (17 - 18)  SpO2: 100% (05 Sep 2020 05:27) (97% - 100%)    CONSTITUTIONAL: NAD, well-developed  RESPIRATORY: Normal respiratory effort; lungs are clear to auscultation bilaterally  CARDIOVASCULAR: Regular rate and rhythm, normal S1 and S2, no murmur/rub/gallop; No lower extremity edema; Peripheral pulses are 2+ bilaterally  ABDOMEN: Nontender to palpation, normoactive bowel sounds, no rebound/guarding  MUSCLOSKELETAL: tender to palpation on R hip bone. ROM limited due to pain on the R. Sensation intact.   PSYCH: A+O to person, place, and time; affect appropriate      LABS:                        15.0   10.84 )-----------( 211      ( 04 Sep 2020 05:30 )             48.3     09-04    136  |  102  |  37<H>  ----------------------------<  140<H>  4.7   |  18<L>  |  1.29    Ca    9.3      04 Sep 2020 05:30  Phos  3.3     09-04  Mg     1.9     09-04                Culture - Urine (collected 02 Sep 2020 13:15)  Source: .Urine Clean Catch (Midstream)  Final Report (03 Sep 2020 16:35):    <10,000 CFU/mL Normal Urogenital Mary        RADIOLOGY & ADDITIONAL TESTS:  Results Reviewed:   Imaging Personally Reviewed:  Electrocardiogram Personally Reviewed:    COORDINATION OF CARE:  Care Discussed with Consultants/Other Providers [Y/N]:  Prior or Outpatient Records Reviewed [Y/N]: PROGRESS NOTE:     CONTACT INFO:   Rob Urena (Xiao)   NS: [phone]/[facility]: [id number]  PGY-2    Patient is a 88y old  Male who presents with a chief complaint of fall (04 Sep 2020 10:46)      SUBJECTIVE / OVERNIGHT EVENTS: no acute event overnight.     ADDITIONAL REVIEW OF SYSTEMS: reports having hip pain when moves around. Tylenol helps. Denies fever, chills, CP, SOB. Constipated.     MEDICATIONS  (STANDING):  dextrose 5%. 1000 milliLiter(s) (50 mL/Hr) IV Continuous <Continuous>  dextrose 50% Injectable 12.5 Gram(s) IV Push once  dextrose 50% Injectable 25 Gram(s) IV Push once  dextrose 50% Injectable 25 Gram(s) IV Push once  heparin   Injectable 5000 Unit(s) SubCutaneous every 8 hours  influenza   Vaccine 0.5 milliLiter(s) IntraMuscular once  insulin lispro (HumaLOG) corrective regimen sliding scale   SubCutaneous three times a day before meals  insulin lispro (HumaLOG) corrective regimen sliding scale   SubCutaneous at bedtime  lidocaine   Patch 1 Patch Transdermal every 24 hours  polyethylene glycol 3350 17 Gram(s) Oral daily  senna 2 Tablet(s) Oral at bedtime    MEDICATIONS  (PRN):  acetaminophen   Tablet .. 650 milliGRAM(s) Oral every 6 hours PRN Moderate Pain (4 - 6)  acetaminophen   Tablet .. 975 milliGRAM(s) Oral every 6 hours PRN Severe Pain (7 - 10)  dextrose 40% Gel 15 Gram(s) Oral once PRN Blood Glucose LESS THAN 70 milliGRAM(s)/deciliter  glucagon  Injectable 1 milliGRAM(s) IntraMuscular once PRN Glucose LESS THAN 70 milligrams/deciliter      CAPILLARY BLOOD GLUCOSE      POCT Blood Glucose.: 122 mg/dL (05 Sep 2020 08:23)  POCT Blood Glucose.: 120 mg/dL (04 Sep 2020 21:39)  POCT Blood Glucose.: 92 mg/dL (04 Sep 2020 17:21)  POCT Blood Glucose.: 92 mg/dL (04 Sep 2020 12:06)    I&O's Summary      PHYSICAL EXAM:  Vital Signs Last 24 Hrs  T(C): 36.6 (05 Sep 2020 05:27), Max: 36.8 (04 Sep 2020 13:21)  T(F): 97.8 (05 Sep 2020 05:27), Max: 98.2 (04 Sep 2020 13:21)  HR: 80 (05 Sep 2020 05:27) (69 - 80)  BP: 138/92 (05 Sep 2020 05:27) (103/66 - 138/92)  BP(mean): --  RR: 17 (05 Sep 2020 05:27) (17 - 18)  SpO2: 100% (05 Sep 2020 05:27) (97% - 100%)    CONSTITUTIONAL: NAD, well-developed  RESPIRATORY: Normal respiratory effort; lungs are clear to auscultation bilaterally  CARDIOVASCULAR: Regular rate and rhythm, normal S1 and S2, no murmur/rub/gallop  ABDOMEN: Nontender to palpation, normoactive bowel sounds, no rebound/guarding  MUSCLOSKELETAL: tender to palpation on R hip bone. ROM limited due to pain on the R. Sensation intact.   PSYCH: A+O to person, place, and time; affect appropriate      LABS:                        15.0   10.84 )-----------( 211      ( 04 Sep 2020 05:30 )             48.3     09-04    136  |  102  |  37<H>  ----------------------------<  140<H>  4.7   |  18<L>  |  1.29    Ca    9.3      04 Sep 2020 05:30  Phos  3.3     09-04  Mg     1.9     09-04                Culture - Urine (collected 02 Sep 2020 13:15)  Source: .Urine Clean Catch (Midstream)  Final Report (03 Sep 2020 16:35):    <10,000 CFU/mL Normal Urogenital Mary        RADIOLOGY & ADDITIONAL TESTS:  Results Reviewed:   Imaging Personally Reviewed:  Electrocardiogram Personally Reviewed:    COORDINATION OF CARE:  Care Discussed with Consultants/Other Providers [Y/N]:  Prior or Outpatient Records Reviewed [Y/N]:

## 2020-09-06 PROCEDURE — 99232 SBSQ HOSP IP/OBS MODERATE 35: CPT | Mod: GC

## 2020-09-06 RX ADMIN — HEPARIN SODIUM 5000 UNIT(S): 5000 INJECTION INTRAVENOUS; SUBCUTANEOUS at 05:42

## 2020-09-06 RX ADMIN — Medication 2: at 17:43

## 2020-09-06 RX ADMIN — LIDOCAINE 1 PATCH: 4 CREAM TOPICAL at 16:06

## 2020-09-06 RX ADMIN — LIDOCAINE 1 PATCH: 4 CREAM TOPICAL at 01:00

## 2020-09-06 RX ADMIN — HEPARIN SODIUM 5000 UNIT(S): 5000 INJECTION INTRAVENOUS; SUBCUTANEOUS at 16:05

## 2020-09-06 RX ADMIN — SENNA PLUS 2 TABLET(S): 8.6 TABLET ORAL at 21:09

## 2020-09-06 RX ADMIN — Medication 975 MILLIGRAM(S): at 16:05

## 2020-09-06 RX ADMIN — Medication 975 MILLIGRAM(S): at 05:42

## 2020-09-06 RX ADMIN — Medication 975 MILLIGRAM(S): at 21:09

## 2020-09-06 RX ADMIN — HEPARIN SODIUM 5000 UNIT(S): 5000 INJECTION INTRAVENOUS; SUBCUTANEOUS at 21:14

## 2020-09-06 RX ADMIN — LIDOCAINE 1 PATCH: 4 CREAM TOPICAL at 18:46

## 2020-09-06 NOTE — PROGRESS NOTE ADULT - PROBLEM SELECTOR PLAN 2
Previously on Januvia 100 mg daily    Plan:   - sliding scale and monitor Previously on Januvia 100 mg daily    Plan:   - glucose levels hae been WNL   - sliding scale and monitor

## 2020-09-06 NOTE — PROGRESS NOTE ADULT - PROBLEM SELECTOR PLAN 3
Leukocytosis 13.37  Most likely reactive from fall  U/A negative, afebrile,  CXR N,  less likely infectious    Plan:   - Monitor CBC  Downtrending Leukocytosis 13.37  Most likely reactive from fall  U/A negative, afebrile,  CXR N,  less likely infectious    Plan:   - Monitor CBC tomorrow   Downtrending

## 2020-09-06 NOTE — PROGRESS NOTE ADULT - PROBLEM SELECTOR PLAN 1
History of falls, most likely mechanical   Previously discharged on 8/29 from [facility] with home PT    Plan:   - fall precautions  -  consult for placement--planned for dc on tuesday  - COVID swab Monday   - PT evaluation rec Inpatient restorative rehabilitation.  - standing tylenol 975 q8 for pain History of falls, most likely mechanical   Previously discharged on 8/29 from [facility] with home PT    Plan:   - fall precautions  - SW consult for placement--planned for dc on tuesday  - COVID swab Monday   - PT evaluation rec Inpatient restorative rehabilitation.  - standing tylenol 975 q8 for pain  - Lidocaine patch as needed for pain

## 2020-09-06 NOTE — PROGRESS NOTE ADULT - SUBJECTIVE AND OBJECTIVE BOX
Kamala Miranda MD  PGY 1 Department of Internal Medicine  Pager: [phone] ([facility])       Patient is a 88y old  Male who presents with a chief complaint of fall (05 Sep 2020 11:50)      SUBJECTIVE / OVERNIGHT EVENTS: Pt seen and examined. No acute overnight events. Patient's Tylenol dose was changed to standing, Dulcolax was added to patient's medication regimen for constipation. Denies fevers, chills, CP, SOB, Abdominal pain, N/V, Constipation, Diarrhea        MEDICATIONS  (STANDING):  acetaminophen   Tablet .. 975 milliGRAM(s) Oral every 8 hours  dextrose 5%. 1000 milliLiter(s) (50 mL/Hr) IV Continuous <Continuous>  dextrose 50% Injectable 12.5 Gram(s) IV Push once  dextrose 50% Injectable 25 Gram(s) IV Push once  dextrose 50% Injectable 25 Gram(s) IV Push once  heparin   Injectable 5000 Unit(s) SubCutaneous every 8 hours  influenza   Vaccine 0.5 milliLiter(s) IntraMuscular once  insulin lispro (HumaLOG) corrective regimen sliding scale   SubCutaneous three times a day before meals  insulin lispro (HumaLOG) corrective regimen sliding scale   SubCutaneous at bedtime  lidocaine   Patch 1 Patch Transdermal every 24 hours  polyethylene glycol 3350 17 Gram(s) Oral daily  senna 2 Tablet(s) Oral at bedtime    MEDICATIONS  (PRN):  dextrose 40% Gel 15 Gram(s) Oral once PRN Blood Glucose LESS THAN 70 milliGRAM(s)/deciliter  glucagon  Injectable 1 milliGRAM(s) IntraMuscular once PRN Glucose LESS THAN 70 milligrams/deciliter      I&O's Summary      Vital Signs Last 24 Hrs  T(C): 36.4 (06 Sep 2020 05:39), Max: 36.4 (06 Sep 2020 05:39)  T(F): 97.5 (06 Sep 2020 05:39), Max: 97.5 (06 Sep 2020 05:39)  HR: 61 (06 Sep 2020 05:39) (61 - 68)  BP: 111/66 (06 Sep 2020 05:39) (108/60 - 111/66)  BP(mean): --  RR: 18 (06 Sep 2020 05:39) (18 - 18)  SpO2: 100% (06 Sep 2020 05:39) (100% - 100%)    CAPILLARY BLOOD GLUCOSE      POCT Blood Glucose.: 143 mg/dL (05 Sep 2020 22:04)  POCT Blood Glucose.: 93 mg/dL (05 Sep 2020 17:37)  POCT Blood Glucose.: 115 mg/dL (05 Sep 2020 12:05)  POCT Blood Glucose.: 122 mg/dL (05 Sep 2020 08:23)      PHYSICAL EXAM:  GENERAL: NAD,   HEAD:  Atraumatic, Normocephalic  EYES: EOMI, PERRL, conjunctiva and sclera clear  NECK: No JVD  CHEST/LUNG: Clear to auscultation bilaterally; No wheeze  HEART: Regular rate and rhythm; No murmurs, rubs, or gallops  ABDOMEN: Soft, Nontender, Nondistended; Bowel sounds present  EXTREMITIES:  2+ Peripheral Pulses, No clubbing, cyanosis, or edema  PSYCH: AAOx3  NEUROLOGY: non-focal  SKIN: No rashes or lesions       LABS:                      RADIOLOGY & ADDITIONAL TESTS:    Imaging Personally Reviewed:    Consultant(s) Notes Reviewed:      Care Discussed with Consultants/Other Providers: Kamala Miranda MD  PGY 1 Department of Internal Medicine  Pager: [phone] ([facility])       Patient is a 88y old  Male who presents with a chief complaint of fall (05 Sep 2020 11:50)      SUBJECTIVE / OVERNIGHT EVENTS: Pt seen and examined. No acute overnight events. Patient's Tylenol dose was changed to standing, Dulcolax was added to patient's medication regimen for constipation. Today patient is eating breakfast comfortably, no other complaints. Denies fevers, chills, CP, SOB, Abdominal pain, N/V, Constipation, Diarrhea        MEDICATIONS  (STANDING):  acetaminophen   Tablet .. 975 milliGRAM(s) Oral every 8 hours  dextrose 5%. 1000 milliLiter(s) (50 mL/Hr) IV Continuous <Continuous>  dextrose 50% Injectable 12.5 Gram(s) IV Push once  dextrose 50% Injectable 25 Gram(s) IV Push once  dextrose 50% Injectable 25 Gram(s) IV Push once  heparin   Injectable 5000 Unit(s) SubCutaneous every 8 hours  influenza   Vaccine 0.5 milliLiter(s) IntraMuscular once  insulin lispro (HumaLOG) corrective regimen sliding scale   SubCutaneous three times a day before meals  insulin lispro (HumaLOG) corrective regimen sliding scale   SubCutaneous at bedtime  lidocaine   Patch 1 Patch Transdermal every 24 hours  polyethylene glycol 3350 17 Gram(s) Oral daily  senna 2 Tablet(s) Oral at bedtime    MEDICATIONS  (PRN):  dextrose 40% Gel 15 Gram(s) Oral once PRN Blood Glucose LESS THAN 70 milliGRAM(s)/deciliter  glucagon  Injectable 1 milliGRAM(s) IntraMuscular once PRN Glucose LESS THAN 70 milligrams/deciliter      I&O's Summary      Vital Signs Last 24 Hrs  T(C): 36.4 (06 Sep 2020 05:39), Max: 36.4 (06 Sep 2020 05:39)  T(F): 97.5 (06 Sep 2020 05:39), Max: 97.5 (06 Sep 2020 05:39)  HR: 61 (06 Sep 2020 05:39) (61 - 68)  BP: 111/66 (06 Sep 2020 05:39) (108/60 - 111/66)  BP(mean): --  RR: 18 (06 Sep 2020 05:39) (18 - 18)  SpO2: 100% (06 Sep 2020 05:39) (100% - 100%)    CAPILLARY BLOOD GLUCOSE      POCT Blood Glucose.: 143 mg/dL (05 Sep 2020 22:04)  POCT Blood Glucose.: 93 mg/dL (05 Sep 2020 17:37)  POCT Blood Glucose.: 115 mg/dL (05 Sep 2020 12:05)  POCT Blood Glucose.: 122 mg/dL (05 Sep 2020 08:23)      PHYSICAL EXAM:  GENERAL: NAD, sitting in bed comfortably eating breakfast   HEAD:  Atraumatic, Normocephalic  EYES: EOMI,   CHEST/LUNG: Clear to auscultation bilaterally; No wheeze  HEART: Regular rate and rhythm; No murmurs, rubs, or gallops  ABDOMEN: Soft, Nontender, Nondistended; Bowel sounds present  EXTREMITIES:  2+ Peripheral Pulses, No clubbing, cyanosis, or edema  PSYCH: AAOx3  NEUROLOGY: non-focal  SKIN: No rashes or lesions       LABS:    Lab holiday

## 2020-09-06 NOTE — PROGRESS NOTE ADULT - PROBLEM SELECTOR PLAN 4
SCr 1.35  Patient's baseline seems to be 1.5  Most likely CKD    Plan:   - monitor BMP  - Improving SCr 1.35  Patient's baseline seems to be 1.5  Most likely CKD  However BUN elevated, may have pre-renal azotemia     Plan:   - monitor BMP, BMP in AM   - Encourage fluid intake

## 2020-09-06 NOTE — PROGRESS NOTE ADULT - PROBLEM SELECTOR PLAN 5
b/l venous stasis hyperpigmentation on LE  Plan:   - advised patient on leg elevation  - advised on compression stockings

## 2020-09-07 LAB
ANION GAP SERPL CALC-SCNC: 12 MMO/L — SIGNIFICANT CHANGE UP (ref 7–14)
ANION GAP SERPL CALC-SCNC: 20 MMO/L — HIGH (ref 7–14)
BUN SERPL-MCNC: 30 MG/DL — HIGH (ref 7–23)
BUN SERPL-MCNC: 34 MG/DL — HIGH (ref 7–23)
CALCIUM SERPL-MCNC: 9.5 MG/DL — SIGNIFICANT CHANGE UP (ref 8.4–10.5)
CALCIUM SERPL-MCNC: 9.6 MG/DL — SIGNIFICANT CHANGE UP (ref 8.4–10.5)
CHLORIDE SERPL-SCNC: 100 MMOL/L — SIGNIFICANT CHANGE UP (ref 98–107)
CHLORIDE SERPL-SCNC: 100 MMOL/L — SIGNIFICANT CHANGE UP (ref 98–107)
CO2 SERPL-SCNC: 13 MMOL/L — LOW (ref 22–31)
CO2 SERPL-SCNC: 27 MMOL/L — SIGNIFICANT CHANGE UP (ref 22–31)
CREAT SERPL-MCNC: 1.2 MG/DL — SIGNIFICANT CHANGE UP (ref 0.5–1.3)
CREAT SERPL-MCNC: 1.28 MG/DL — SIGNIFICANT CHANGE UP (ref 0.5–1.3)
GLUCOSE SERPL-MCNC: 141 MG/DL — HIGH (ref 70–99)
GLUCOSE SERPL-MCNC: 79 MG/DL — SIGNIFICANT CHANGE UP (ref 70–99)
HCT VFR BLD CALC: 46 % — SIGNIFICANT CHANGE UP (ref 39–50)
HGB BLD-MCNC: 15.4 G/DL — SIGNIFICANT CHANGE UP (ref 13–17)
MAGNESIUM SERPL-MCNC: 2 MG/DL — SIGNIFICANT CHANGE UP (ref 1.6–2.6)
MAGNESIUM SERPL-MCNC: 2.1 MG/DL — SIGNIFICANT CHANGE UP (ref 1.6–2.6)
MCHC RBC-ENTMCNC: 31.8 PG — SIGNIFICANT CHANGE UP (ref 27–34)
MCHC RBC-ENTMCNC: 33.5 % — SIGNIFICANT CHANGE UP (ref 32–36)
MCV RBC AUTO: 95 FL — SIGNIFICANT CHANGE UP (ref 80–100)
NRBC # FLD: 0 K/UL — SIGNIFICANT CHANGE UP (ref 0–0)
PHOSPHATE SERPL-MCNC: 2.3 MG/DL — LOW (ref 2.5–4.5)
PHOSPHATE SERPL-MCNC: 3.1 MG/DL — SIGNIFICANT CHANGE UP (ref 2.5–4.5)
PLATELET # BLD AUTO: 220 K/UL — SIGNIFICANT CHANGE UP (ref 150–400)
PMV BLD: 9.7 FL — SIGNIFICANT CHANGE UP (ref 7–13)
POTASSIUM SERPL-MCNC: 4.3 MMOL/L — SIGNIFICANT CHANGE UP (ref 3.5–5.3)
POTASSIUM SERPL-MCNC: 6.3 MMOL/L — CRITICAL HIGH (ref 3.5–5.3)
POTASSIUM SERPL-SCNC: 4.3 MMOL/L — SIGNIFICANT CHANGE UP (ref 3.5–5.3)
POTASSIUM SERPL-SCNC: 6.3 MMOL/L — CRITICAL HIGH (ref 3.5–5.3)
RBC # BLD: 4.84 M/UL — SIGNIFICANT CHANGE UP (ref 4.2–5.8)
RBC # FLD: 13.2 % — SIGNIFICANT CHANGE UP (ref 10.3–14.5)
SARS-COV-2 RNA SPEC QL NAA+PROBE: SIGNIFICANT CHANGE UP
SODIUM SERPL-SCNC: 133 MMOL/L — LOW (ref 135–145)
SODIUM SERPL-SCNC: 139 MMOL/L — SIGNIFICANT CHANGE UP (ref 135–145)
WBC # BLD: 8.52 K/UL — SIGNIFICANT CHANGE UP (ref 3.8–10.5)
WBC # FLD AUTO: 8.52 K/UL — SIGNIFICANT CHANGE UP (ref 3.8–10.5)

## 2020-09-07 PROCEDURE — 99232 SBSQ HOSP IP/OBS MODERATE 35: CPT

## 2020-09-07 RX ORDER — GABAPENTIN 400 MG/1
100 CAPSULE ORAL THREE TIMES A DAY
Refills: 0 | Status: DISCONTINUED | OUTPATIENT
Start: 2020-09-07 | End: 2020-09-11

## 2020-09-07 RX ORDER — SODIUM,POTASSIUM PHOSPHATES 278-250MG
1 POWDER IN PACKET (EA) ORAL
Refills: 0 | Status: COMPLETED | OUTPATIENT
Start: 2020-09-07 | End: 2020-09-07

## 2020-09-07 RX ORDER — POTASSIUM PHOSPHATE, MONOBASIC POTASSIUM PHOSPHATE, DIBASIC 236; 224 MG/ML; MG/ML
15 INJECTION, SOLUTION INTRAVENOUS ONCE
Refills: 0 | Status: DISCONTINUED | OUTPATIENT
Start: 2020-09-07 | End: 2020-09-07

## 2020-09-07 RX ORDER — OXYCODONE HYDROCHLORIDE 5 MG/1
5 TABLET ORAL ONCE
Refills: 0 | Status: DISCONTINUED | OUTPATIENT
Start: 2020-09-07 | End: 2020-09-07

## 2020-09-07 RX ADMIN — GABAPENTIN 100 MILLIGRAM(S): 400 CAPSULE ORAL at 15:19

## 2020-09-07 RX ADMIN — OXYCODONE HYDROCHLORIDE 5 MILLIGRAM(S): 5 TABLET ORAL at 10:49

## 2020-09-07 RX ADMIN — LIDOCAINE 1 PATCH: 4 CREAM TOPICAL at 12:19

## 2020-09-07 RX ADMIN — LIDOCAINE 1 PATCH: 4 CREAM TOPICAL at 18:52

## 2020-09-07 RX ADMIN — Medication 1 TABLET(S): at 15:19

## 2020-09-07 RX ADMIN — POLYETHYLENE GLYCOL 3350 17 GRAM(S): 17 POWDER, FOR SOLUTION ORAL at 12:19

## 2020-09-07 RX ADMIN — Medication 1: at 18:18

## 2020-09-07 RX ADMIN — HEPARIN SODIUM 5000 UNIT(S): 5000 INJECTION INTRAVENOUS; SUBCUTANEOUS at 05:19

## 2020-09-07 RX ADMIN — HEPARIN SODIUM 5000 UNIT(S): 5000 INJECTION INTRAVENOUS; SUBCUTANEOUS at 21:09

## 2020-09-07 RX ADMIN — LIDOCAINE 1 PATCH: 4 CREAM TOPICAL at 04:17

## 2020-09-07 RX ADMIN — OXYCODONE HYDROCHLORIDE 5 MILLIGRAM(S): 5 TABLET ORAL at 10:19

## 2020-09-07 RX ADMIN — Medication 975 MILLIGRAM(S): at 12:19

## 2020-09-07 RX ADMIN — Medication 975 MILLIGRAM(S): at 05:20

## 2020-09-07 RX ADMIN — SENNA PLUS 2 TABLET(S): 8.6 TABLET ORAL at 21:09

## 2020-09-07 RX ADMIN — GABAPENTIN 100 MILLIGRAM(S): 400 CAPSULE ORAL at 21:09

## 2020-09-07 RX ADMIN — HEPARIN SODIUM 5000 UNIT(S): 5000 INJECTION INTRAVENOUS; SUBCUTANEOUS at 12:20

## 2020-09-07 RX ADMIN — Medication 975 MILLIGRAM(S): at 21:09

## 2020-09-07 NOTE — PROGRESS NOTE ADULT - PROBLEM SELECTOR PLAN 1
History of falls, most likely mechanical   Previously discharged on 8/29 from [place] with home PT    Plan:   - fall precautions  - SW consult for placement--planned for dc on tuesday  - COVID swab today  - PT evaluation rec Inpatient restorative rehabilitation.  - standing tylenol 975 q8 for pain  - Lidocaine patch as needed for pain

## 2020-09-07 NOTE — PROGRESS NOTE ADULT - PROBLEM SELECTOR PLAN 2
Previously on Januvia 100 mg daily    Plan:   - glucose levels hae been WNL   - sliding scale and monitor

## 2020-09-07 NOTE — PROVIDER CONTACT NOTE (CRITICAL VALUE NOTIFICATION) - ASSESSMENT
Potassium 6.3, moderately hemolyzed. Patient resting at bedside eating breakfast with no signs of distress noted.

## 2020-09-07 NOTE — PROGRESS NOTE ADULT - PROBLEM SELECTOR PLAN 4
SCr 1.35  Patient's baseline seems to be 1.5  Most likely CKD  However BUN elevated, may have pre-renal azotemia     Plan:   - monitor BMP, BMP in AM   - Encourage fluid intake

## 2020-09-07 NOTE — PROGRESS NOTE ADULT - PROBLEM SELECTOR PLAN 3
Leukocytosis 13.37  Most likely reactive from fall  U/A negative, afebrile,  CXR N,  less likely infectious    Plan:   Downtrending  No further intervention/investigation at this time

## 2020-09-07 NOTE — PROGRESS NOTE ADULT - SUBJECTIVE AND OBJECTIVE BOX
Kamala Miranda MD  PGY 1 Department of Internal Medicine  Pager: [phone] ([facility])       Patient is a 88y old  Male who presents with a chief complaint of fall (06 Sep 2020 06:56)      SUBJECTIVE / OVERNIGHT EVENTS: Pt seen and examined. No acute overnight events. Denies fevers, chills, CP, SOB, Abdominal pain, N/V, Constipation, Diarrhea        MEDICATIONS  (STANDING):  acetaminophen   Tablet .. 975 milliGRAM(s) Oral every 8 hours  dextrose 5%. 1000 milliLiter(s) (50 mL/Hr) IV Continuous <Continuous>  dextrose 50% Injectable 12.5 Gram(s) IV Push once  dextrose 50% Injectable 25 Gram(s) IV Push once  dextrose 50% Injectable 25 Gram(s) IV Push once  heparin   Injectable 5000 Unit(s) SubCutaneous every 8 hours  influenza   Vaccine 0.5 milliLiter(s) IntraMuscular once  insulin lispro (HumaLOG) corrective regimen sliding scale   SubCutaneous three times a day before meals  insulin lispro (HumaLOG) corrective regimen sliding scale   SubCutaneous at bedtime  lidocaine   Patch 1 Patch Transdermal every 24 hours  polyethylene glycol 3350 17 Gram(s) Oral daily  senna 2 Tablet(s) Oral at bedtime    MEDICATIONS  (PRN):  dextrose 40% Gel 15 Gram(s) Oral once PRN Blood Glucose LESS THAN 70 milliGRAM(s)/deciliter  glucagon  Injectable 1 milliGRAM(s) IntraMuscular once PRN Glucose LESS THAN 70 milligrams/deciliter      I&O's Summary      Vital Signs Last 24 Hrs  T(C): 36.2 (07 Sep 2020 05:17), Max: 37 (06 Sep 2020 12:20)  T(F): 97.2 (07 Sep 2020 05:17), Max: 98.6 (06 Sep 2020 12:20)  HR: 70 (07 Sep 2020 05:17) (63 - 70)  BP: 135/57 (07 Sep 2020 05:17) (115/64 - 135/57)  BP(mean): --  RR: 18 (07 Sep 2020 05:17) (17 - 18)  SpO2: 100% (07 Sep 2020 05:17) (99% - 100%)    CAPILLARY BLOOD GLUCOSE      POCT Blood Glucose.: 86 mg/dL (07 Sep 2020 08:19)  POCT Blood Glucose.: 116 mg/dL (06 Sep 2020 21:09)  POCT Blood Glucose.: 209 mg/dL (06 Sep 2020 17:08)  POCT Blood Glucose.: 110 mg/dL (06 Sep 2020 11:55)  POCT Blood Glucose.: 118 mg/dL (06 Sep 2020 08:26)      PHYSICAL EXAM:  GENERAL: NAD,   HEAD:  Atraumatic, Normocephalic  EYES: EOMI, PERRL, conjunctiva and sclera clear  NECK: No JVD  CHEST/LUNG: Clear to auscultation bilaterally; No wheeze  HEART: Regular rate and rhythm; No murmurs, rubs, or gallops  ABDOMEN: Soft, Nontender, Nondistended; Bowel sounds present  EXTREMITIES:  2+ Peripheral Pulses, No clubbing, cyanosis, or edema  PSYCH: AAOx3  NEUROLOGY: non-focal  SKIN: No rashes or lesions       LABS:                          Imaging Personally Reviewed:    Consultant(s) Notes Reviewed:      Care Discussed with Consultants/Other Providers:

## 2020-09-08 PROCEDURE — 99232 SBSQ HOSP IP/OBS MODERATE 35: CPT | Mod: GC

## 2020-09-08 RX ORDER — GABAPENTIN 400 MG/1
1 CAPSULE ORAL
Qty: 0 | Refills: 0 | DISCHARGE
Start: 2020-09-08

## 2020-09-08 RX ORDER — ACETAMINOPHEN 500 MG
3 TABLET ORAL
Qty: 0 | Refills: 0 | DISCHARGE
Start: 2020-09-08

## 2020-09-08 RX ADMIN — GABAPENTIN 100 MILLIGRAM(S): 400 CAPSULE ORAL at 12:04

## 2020-09-08 RX ADMIN — HEPARIN SODIUM 5000 UNIT(S): 5000 INJECTION INTRAVENOUS; SUBCUTANEOUS at 12:04

## 2020-09-08 RX ADMIN — HEPARIN SODIUM 5000 UNIT(S): 5000 INJECTION INTRAVENOUS; SUBCUTANEOUS at 05:02

## 2020-09-08 RX ADMIN — Medication 975 MILLIGRAM(S): at 12:05

## 2020-09-08 RX ADMIN — HEPARIN SODIUM 5000 UNIT(S): 5000 INJECTION INTRAVENOUS; SUBCUTANEOUS at 21:30

## 2020-09-08 RX ADMIN — GABAPENTIN 100 MILLIGRAM(S): 400 CAPSULE ORAL at 21:30

## 2020-09-08 RX ADMIN — LIDOCAINE 1 PATCH: 4 CREAM TOPICAL at 00:23

## 2020-09-08 RX ADMIN — Medication 975 MILLIGRAM(S): at 21:30

## 2020-09-08 RX ADMIN — GABAPENTIN 100 MILLIGRAM(S): 400 CAPSULE ORAL at 05:02

## 2020-09-08 RX ADMIN — POLYETHYLENE GLYCOL 3350 17 GRAM(S): 17 POWDER, FOR SOLUTION ORAL at 12:05

## 2020-09-08 RX ADMIN — Medication 975 MILLIGRAM(S): at 05:02

## 2020-09-08 RX ADMIN — LIDOCAINE 1 PATCH: 4 CREAM TOPICAL at 19:20

## 2020-09-08 RX ADMIN — LIDOCAINE 1 PATCH: 4 CREAM TOPICAL at 14:21

## 2020-09-08 NOTE — PROGRESS NOTE ADULT - PROBLEM SELECTOR PLAN 1
History of falls, most likely mechanical   Previously discharged on 8/29 from [place] with home PT    Plan:   - fall precautions  -  consult for placement--planned for dc on tuesday  - COVID swab negative   - PT evaluation rec Inpatient restorative rehabilitation.  - standing tylenol 975 q8 for pain  - Lidocaine patch as needed for pain

## 2020-09-08 NOTE — PROGRESS NOTE ADULT - SUBJECTIVE AND OBJECTIVE BOX
Kamala Miranda MD  PGY 1 Department of Internal Medicine  Pager: [phone] ([facility])       Patient is a 88y old  Male who presents with a chief complaint of fall (07 Sep 2020 08:25)      SUBJECTIVE / OVERNIGHT EVENTS: Pt seen and examined. No acute overnight events. Denies fevers, chills, CP, SOB, Abdominal pain, N/V, Constipation, Diarrhea        MEDICATIONS  (STANDING):  acetaminophen   Tablet .. 975 milliGRAM(s) Oral every 8 hours  dextrose 5%. 1000 milliLiter(s) (50 mL/Hr) IV Continuous <Continuous>  dextrose 50% Injectable 12.5 Gram(s) IV Push once  dextrose 50% Injectable 25 Gram(s) IV Push once  dextrose 50% Injectable 25 Gram(s) IV Push once  gabapentin 100 milliGRAM(s) Oral three times a day  heparin   Injectable 5000 Unit(s) SubCutaneous every 8 hours  influenza   Vaccine 0.5 milliLiter(s) IntraMuscular once  insulin lispro (HumaLOG) corrective regimen sliding scale   SubCutaneous three times a day before meals  insulin lispro (HumaLOG) corrective regimen sliding scale   SubCutaneous at bedtime  lidocaine   Patch 1 Patch Transdermal every 24 hours  polyethylene glycol 3350 17 Gram(s) Oral daily  senna 2 Tablet(s) Oral at bedtime    MEDICATIONS  (PRN):  dextrose 40% Gel 15 Gram(s) Oral once PRN Blood Glucose LESS THAN 70 milliGRAM(s)/deciliter  glucagon  Injectable 1 milliGRAM(s) IntraMuscular once PRN Glucose LESS THAN 70 milligrams/deciliter      I&O's Summary      CAPILLARY BLOOD GLUCOSE      POCT Blood Glucose.: 87 mg/dL (07 Sep 2020 21:08)  POCT Blood Glucose.: 170 mg/dL (07 Sep 2020 17:24)  POCT Blood Glucose.: 116 mg/dL (07 Sep 2020 12:09)  POCT Blood Glucose.: 86 mg/dL (07 Sep 2020 08:19)      PHYSICAL EXAM:    Vital Signs Last 24 Hrs  T(C): 36.3 (08 Sep 2020 05:10), Max: 36.6 (07 Sep 2020 15:17)  T(F): 97.4 (08 Sep 2020 05:10), Max: 97.9 (07 Sep 2020 15:17)  HR: 63 (08 Sep 2020 05:10) (63 - 81)  BP: 128/82 (08 Sep 2020 05:10) (116/68 - 129/91)  BP(mean): --  RR: 18 (08 Sep 2020 05:10) (18 - 18)  SpO2: 100% (08 Sep 2020 05:10) (100% - 100%)    GENERAL: NAD,   HEAD:  Atraumatic, Normocephalic  EYES: EOMI, PERRL, conjunctiva and sclera clear  NECK: No JVD  CHEST/LUNG: Clear to auscultation bilaterally; No wheeze  HEART: Regular rate and rhythm; No murmurs, rubs, or gallops  ABDOMEN: Soft, Nontender, Nondistended; Bowel sounds present  EXTREMITIES:  2+ Peripheral Pulses, No clubbing, cyanosis, or edema  PSYCH: AAOx3  NEUROLOGY: non-focal  SKIN: No rashes or lesions       LABS:                        15.4   8.52  )-----------( 220      ( 07 Sep 2020 09:30 )             46.0     Auto Eosinophil # x     / Auto Eosinophil % x     / Auto Neutrophil # x     / Auto Neutrophil % x     / BANDS % x        09-07    139  |  100  |  30<H>  ----------------------------<  141<H>  4.3   |  27  |  1.28  09-07    133<L>  |  100  |  34<H>  ----------------------------<  79  6.3<HH>   |  13<L>  |  1.20    Ca    9.5      07 Sep 2020 09:30  Mg     2.0     09-07  Phos  2.3     09-07

## 2020-09-09 LAB — SARS-COV-2 RNA SPEC QL NAA+PROBE: SIGNIFICANT CHANGE UP

## 2020-09-09 PROCEDURE — 99232 SBSQ HOSP IP/OBS MODERATE 35: CPT | Mod: GC

## 2020-09-09 RX ADMIN — HEPARIN SODIUM 5000 UNIT(S): 5000 INJECTION INTRAVENOUS; SUBCUTANEOUS at 13:02

## 2020-09-09 RX ADMIN — LIDOCAINE 1 PATCH: 4 CREAM TOPICAL at 19:27

## 2020-09-09 RX ADMIN — GABAPENTIN 100 MILLIGRAM(S): 400 CAPSULE ORAL at 21:36

## 2020-09-09 RX ADMIN — GABAPENTIN 100 MILLIGRAM(S): 400 CAPSULE ORAL at 05:13

## 2020-09-09 RX ADMIN — HEPARIN SODIUM 5000 UNIT(S): 5000 INJECTION INTRAVENOUS; SUBCUTANEOUS at 21:36

## 2020-09-09 RX ADMIN — GABAPENTIN 100 MILLIGRAM(S): 400 CAPSULE ORAL at 13:02

## 2020-09-09 RX ADMIN — Medication 975 MILLIGRAM(S): at 21:36

## 2020-09-09 RX ADMIN — LIDOCAINE 1 PATCH: 4 CREAM TOPICAL at 13:03

## 2020-09-09 RX ADMIN — Medication 975 MILLIGRAM(S): at 05:12

## 2020-09-09 RX ADMIN — LIDOCAINE 1 PATCH: 4 CREAM TOPICAL at 02:09

## 2020-09-09 RX ADMIN — HEPARIN SODIUM 5000 UNIT(S): 5000 INJECTION INTRAVENOUS; SUBCUTANEOUS at 05:13

## 2020-09-09 RX ADMIN — SENNA PLUS 2 TABLET(S): 8.6 TABLET ORAL at 21:36

## 2020-09-09 RX ADMIN — Medication 975 MILLIGRAM(S): at 13:02

## 2020-09-09 NOTE — DIETITIAN INITIAL EVALUATION ADULT. - PERTINENT LABORATORY DATA
09-07 Na139 mmol/L Glu 141 mg/dL<H> K+ 4.3 mmol/L Cr  1.28 mg/dL BUN 30 mg/dL<H> 09-07 Phos 2.3 mg/dL<L>  HbA1c 6.3%   Vitamin D,25 17.8<L>  CAPILLARY BLOOD GLUCOSE      POCT Blood Glucose.: 102 mg/dL (09 Sep 2020 12:04)  POCT Blood Glucose.: 116 mg/dL (09 Sep 2020 08:35)  POCT Blood Glucose.: 156 mg/dL (08 Sep 2020 21:47)  POCT Blood Glucose.: 140 mg/dL (08 Sep 2020 17:26)

## 2020-09-09 NOTE — DIETITIAN INITIAL EVALUATION ADULT. - OTHER INFO
Pt. endorses consistently good appetite/PO intake & denies food allergies, nausea/vomiting/diarrhea/constipation, or issues with chewing/swallowing.  No significant weight change PTA, per Pt.     Generally eats 3 meals/day at home and drinks primarily water as main beverage.  Denies hypoglycemic episodes.   RDN discussed therapeutic diet, emphasizing consistent carbohydrate intake and heart healthy food choices.    Pt. receptive to education and does not voice any questions at this time.

## 2020-09-09 NOTE — PROGRESS NOTE ADULT - SUBJECTIVE AND OBJECTIVE BOX
Kamala Miranda MD  PGY 1 Department of Internal Medicine  Pager: [phone] ([facility])       Patient is a 88y old  Male who presents with a chief complaint of fall (08 Sep 2020 07:43)      SUBJECTIVE / OVERNIGHT EVENTS: Pt seen and examined. No acute overnight events. Patient waiting for authorization for [facility].  Denies fevers, chills, CP, SOB, Abdominal pain, N/V, Constipation, Diarrhea        MEDICATIONS  (STANDING):  acetaminophen   Tablet .. 975 milliGRAM(s) Oral every 8 hours  dextrose 5%. 1000 milliLiter(s) (50 mL/Hr) IV Continuous <Continuous>  dextrose 50% Injectable 12.5 Gram(s) IV Push once  dextrose 50% Injectable 25 Gram(s) IV Push once  dextrose 50% Injectable 25 Gram(s) IV Push once  gabapentin 100 milliGRAM(s) Oral three times a day  heparin   Injectable 5000 Unit(s) SubCutaneous every 8 hours  influenza   Vaccine 0.5 milliLiter(s) IntraMuscular once  insulin lispro (HumaLOG) corrective regimen sliding scale   SubCutaneous three times a day before meals  insulin lispro (HumaLOG) corrective regimen sliding scale   SubCutaneous at bedtime  lidocaine   Patch 1 Patch Transdermal every 24 hours  polyethylene glycol 3350 17 Gram(s) Oral daily  senna 2 Tablet(s) Oral at bedtime    MEDICATIONS  (PRN):  dextrose 40% Gel 15 Gram(s) Oral once PRN Blood Glucose LESS THAN 70 milliGRAM(s)/deciliter  glucagon  Injectable 1 milliGRAM(s) IntraMuscular once PRN Glucose LESS THAN 70 milligrams/deciliter      I&O's Summary        CAPILLARY BLOOD GLUCOSE      POCT Blood Glucose.: 156 mg/dL (08 Sep 2020 21:47)  POCT Blood Glucose.: 140 mg/dL (08 Sep 2020 17:26)  POCT Blood Glucose.: 119 mg/dL (08 Sep 2020 12:00)  POCT Blood Glucose.: 119 mg/dL (08 Sep 2020 08:27)      PHYSICAL EXAM:    Vital Signs Last 24 Hrs  T(C): 36.4 (09 Sep 2020 05:11), Max: 36.6 (08 Sep 2020 21:21)  T(F): 97.5 (09 Sep 2020 05:11), Max: 97.9 (08 Sep 2020 21:21)  HR: 66 (09 Sep 2020 05:11) (66 - 75)  BP: 118/67 (09 Sep 2020 05:11) (107/79 - 135/60)  BP(mean): --  RR: 17 (09 Sep 2020 05:11) (17 - 19)  SpO2: 100% (09 Sep 2020 05:11) (98% - 100%)    GENERAL: NAD, sitting in bed comfortably eating breakfast   HEAD:  Atraumatic, Normocephalic  EYES: EOMI,   CHEST/LUNG: Clear to auscultation bilaterally; No wheeze  HEART: Regular rate and rhythm; No murmurs, rubs, or gallops  ABDOMEN: Soft, Nontender, Nondistended; Bowel sounds present  EXTREMITIES:  2+ Peripheral Pulses, No clubbing, cyanosis, or edema  LABS:                        15.4   8.52  )-----------( 220      ( 07 Sep 2020 09:30 )             46.0     Auto Eosinophil # x     / Auto Eosinophil % x     / Auto Neutrophil # x     / Auto Neutrophil % x     / BANDS % x        09-07    139  |  100  |  30<H>  ----------------------------<  141<H>  4.3   |  27  |  1.28    Ca    9.5      07 Sep 2020 09:30  Mg     2.0     09-07  Phos  2.3     09-07

## 2020-09-09 NOTE — PROGRESS NOTE ADULT - PROBLEM SELECTOR PLAN 1
History of falls, most likely mechanical   Previously discharged on 8/29 from [place] with home PT    Plan:   - fall precautions  - SW consult for placement  - COVID swab negative   - PT evaluation rec Inpatient restorative rehabilitation.  - standing tylenol 975 q8 for pain  - Lidocaine patch as needed for pain  - Gabapentin for LE pain

## 2020-09-09 NOTE — PROGRESS NOTE ADULT - PROBLEM SELECTOR PLAN 3
Leukocytosis 13.37  Most likely reactive from fall  U/A negative, afebrile,  CXR N,  less likely infectious    Plan:   Resolved

## 2020-09-09 NOTE — DIETITIAN INITIAL EVALUATION ADULT. - PROBLEM SELECTOR PLAN 1
History of falls, most likely mechanical   Previously discharged on 8/29 from [place] with home PT    Plan:   - fall precautions  - SW consult for placement  - PT evaluation

## 2020-09-09 NOTE — PROGRESS NOTE ADULT - PROBLEM SELECTOR PLAN 4
SCr 1.35  Patient's baseline seems to be 1.5  Most likely CKD  However BUN elevated, may have pre-renal azotemia     Plan:   - Monitor BMP

## 2020-09-09 NOTE — DIETITIAN INITIAL EVALUATION ADULT. - PERTINENT MEDS FT
MEDICATIONS  (STANDING):  acetaminophen   Tablet .. 975 milliGRAM(s) Oral every 8 hours  dextrose 5%. 1000 milliLiter(s) (50 mL/Hr) IV Continuous <Continuous>  dextrose 50% Injectable 12.5 Gram(s) IV Push once  dextrose 50% Injectable 25 Gram(s) IV Push once  dextrose 50% Injectable 25 Gram(s) IV Push once  gabapentin 100 milliGRAM(s) Oral three times a day  heparin   Injectable 5000 Unit(s) SubCutaneous every 8 hours  influenza   Vaccine 0.5 milliLiter(s) IntraMuscular once  insulin lispro (HumaLOG) corrective regimen sliding scale   SubCutaneous three times a day before meals  insulin lispro (HumaLOG) corrective regimen sliding scale   SubCutaneous at bedtime  lidocaine   Patch 1 Patch Transdermal every 24 hours  polyethylene glycol 3350 17 Gram(s) Oral daily  senna 2 Tablet(s) Oral at bedtime

## 2020-09-09 NOTE — DIETITIAN INITIAL EVALUATION ADULT. - PROBLEM/PLAN-3
Notify patient all lab results are good I will go over everything in more detail in October at her next appointment DISPLAY PLAN FREE TEXT

## 2020-09-10 ENCOUNTER — TRANSCRIPTION ENCOUNTER (OUTPATIENT)
Age: 85
End: 2020-09-10

## 2020-09-10 PROCEDURE — 99232 SBSQ HOSP IP/OBS MODERATE 35: CPT | Mod: GC

## 2020-09-10 RX ORDER — LIDOCAINE 4 G/100G
1 CREAM TOPICAL
Qty: 30 | Refills: 0
Start: 2020-09-10 | End: 2020-10-09

## 2020-09-10 RX ORDER — SENNA PLUS 8.6 MG/1
2 TABLET ORAL
Qty: 60 | Refills: 0
Start: 2020-09-10 | End: 2020-10-09

## 2020-09-10 RX ORDER — ACETAMINOPHEN 500 MG
2 TABLET ORAL
Qty: 240 | Refills: 0
Start: 2020-09-10 | End: 2020-10-09

## 2020-09-10 RX ORDER — POLYETHYLENE GLYCOL 3350 17 G/17G
17 POWDER, FOR SOLUTION ORAL
Qty: 510 | Refills: 0
Start: 2020-09-10 | End: 2020-10-09

## 2020-09-10 RX ORDER — GABAPENTIN 400 MG/1
1 CAPSULE ORAL
Qty: 90 | Refills: 0
Start: 2020-09-10 | End: 2020-10-09

## 2020-09-10 RX ADMIN — Medication 975 MILLIGRAM(S): at 12:00

## 2020-09-10 RX ADMIN — SENNA PLUS 2 TABLET(S): 8.6 TABLET ORAL at 21:11

## 2020-09-10 RX ADMIN — LIDOCAINE 1 PATCH: 4 CREAM TOPICAL at 12:01

## 2020-09-10 RX ADMIN — POLYETHYLENE GLYCOL 3350 17 GRAM(S): 17 POWDER, FOR SOLUTION ORAL at 11:55

## 2020-09-10 RX ADMIN — GABAPENTIN 100 MILLIGRAM(S): 400 CAPSULE ORAL at 21:11

## 2020-09-10 RX ADMIN — HEPARIN SODIUM 5000 UNIT(S): 5000 INJECTION INTRAVENOUS; SUBCUTANEOUS at 05:03

## 2020-09-10 RX ADMIN — GABAPENTIN 100 MILLIGRAM(S): 400 CAPSULE ORAL at 05:03

## 2020-09-10 RX ADMIN — HEPARIN SODIUM 5000 UNIT(S): 5000 INJECTION INTRAVENOUS; SUBCUTANEOUS at 12:00

## 2020-09-10 RX ADMIN — LIDOCAINE 1 PATCH: 4 CREAM TOPICAL at 18:41

## 2020-09-10 RX ADMIN — LIDOCAINE 1 PATCH: 4 CREAM TOPICAL at 01:52

## 2020-09-10 RX ADMIN — GABAPENTIN 100 MILLIGRAM(S): 400 CAPSULE ORAL at 12:00

## 2020-09-10 RX ADMIN — Medication 975 MILLIGRAM(S): at 05:03

## 2020-09-10 RX ADMIN — Medication 975 MILLIGRAM(S): at 21:11

## 2020-09-10 NOTE — DISCHARGE NOTE NURSING/CASE MANAGEMENT/SOCIAL WORK - PATIENT PORTAL LINK FT
You can access the FollowMyHealth Patient Portal offered by [facility] by registering at the following website: [email or web address]. By joining [facility]’s FollowMyHealth portal, you will also be able to view your health information using other applications (apps) compatible with our system.

## 2020-09-10 NOTE — PROGRESS NOTE ADULT - SUBJECTIVE AND OBJECTIVE BOX
Kamala Miranda MD  PGY 1 Department of Internal Medicine  Pager: [phone] ([facility])       Patient is a 88y old  Male who presents with a chief complaint of fall (09 Sep 2020 07:16)      SUBJECTIVE / OVERNIGHT EVENTS: Pt seen and examined. No acute overnight events. Denies fevers, chills, CP, SOB, Abdominal pain, N/V, Constipation, Diarrhea        MEDICATIONS  (STANDING):  acetaminophen   Tablet .. 975 milliGRAM(s) Oral every 8 hours  dextrose 5%. 1000 milliLiter(s) (50 mL/Hr) IV Continuous <Continuous>  dextrose 50% Injectable 12.5 Gram(s) IV Push once  dextrose 50% Injectable 25 Gram(s) IV Push once  dextrose 50% Injectable 25 Gram(s) IV Push once  gabapentin 100 milliGRAM(s) Oral three times a day  heparin   Injectable 5000 Unit(s) SubCutaneous every 8 hours  influenza   Vaccine 0.5 milliLiter(s) IntraMuscular once  insulin lispro (HumaLOG) corrective regimen sliding scale   SubCutaneous three times a day before meals  insulin lispro (HumaLOG) corrective regimen sliding scale   SubCutaneous at bedtime  lidocaine   Patch 1 Patch Transdermal every 24 hours  polyethylene glycol 3350 17 Gram(s) Oral daily  senna 2 Tablet(s) Oral at bedtime    MEDICATIONS  (PRN):  dextrose 40% Gel 15 Gram(s) Oral once PRN Blood Glucose LESS THAN 70 milliGRAM(s)/deciliter  glucagon  Injectable 1 milliGRAM(s) IntraMuscular once PRN Glucose LESS THAN 70 milligrams/deciliter      I&O's Summary      CAPILLARY BLOOD GLUCOSE      POCT Blood Glucose.: 131 mg/dL (09 Sep 2020 21:34)  POCT Blood Glucose.: 118 mg/dL (09 Sep 2020 17:31)  POCT Blood Glucose.: 102 mg/dL (09 Sep 2020 12:04)  POCT Blood Glucose.: 116 mg/dL (09 Sep 2020 08:35)      PHYSICAL EXAM:    Vital Signs Last 24 Hrs  T(C): 36.3 (10 Sep 2020 05:02), Max: 36.9 (09 Sep 2020 21:16)  T(F): 97.3 (10 Sep 2020 05:02), Max: 98.4 (09 Sep 2020 21:16)  HR: 65 (10 Sep 2020 05:02) (65 - 69)  BP: 106/63 (10 Sep 2020 05:02) (106/63 - 122/57)  BP(mean): --  RR: 18 (10 Sep 2020 05:02) (18 - 18)  SpO2: 100% (10 Sep 2020 05:02) (99% - 100%)    GENERAL: NAD, sitting in bed comfortably eating breakfast   HEAD:  Atraumatic, Normocephalic  EYES: EOMI,   CHEST/LUNG: Clear to auscultation bilaterally; No wheeze  HEART: Regular rate and rhythm; No murmurs, rubs, or gallops  ABDOMEN: Soft, Nontender, Nondistended; Bowel sounds present  EXTREMITIES:  2+ Peripheral Pulses, No clubbing, cyanosis, or edema  PSYCH: AAOx3  NEUROLOGY: non-focal  SKIN: (+) hyperpigmentation of LE skin b/l (venous stasis)     LABS:    Lab holiday

## 2020-09-10 NOTE — PROGRESS NOTE ADULT - PROBLEM SELECTOR PLAN 1
History of falls, most likely mechanical   Previously discharged on 8/29 from [place] with home PT    Plan:   - fall precautions  - SW consult for placement  - COVID swab negative   - PT evaluation rec Inpatient restorative rehabilitation.  - standing tylenol 975 q8 for pain  - Lidocaine patch as needed for pain  - Gabapentin for LE pain

## 2020-09-11 VITALS
HEART RATE: 69 BPM | SYSTOLIC BLOOD PRESSURE: 117 MMHG | RESPIRATION RATE: 16 BRPM | DIASTOLIC BLOOD PRESSURE: 50 MMHG | TEMPERATURE: 98 F | OXYGEN SATURATION: 100 %

## 2020-09-11 LAB — GLUCOSE BLDC GLUCOMTR-MCNC: 119 MG/DL — HIGH (ref 70–99)

## 2020-09-11 PROCEDURE — 99239 HOSP IP/OBS DSCHRG MGMT >30: CPT | Mod: GC

## 2020-09-11 RX ORDER — LIDOCAINE 4 G/100G
1 CREAM TOPICAL
Qty: 30 | Refills: 0
Start: 2020-09-11 | End: 2020-10-10

## 2020-09-11 RX ORDER — SENNA PLUS 8.6 MG/1
2 TABLET ORAL
Qty: 60 | Refills: 0
Start: 2020-09-11 | End: 2020-10-10

## 2020-09-11 RX ORDER — POLYETHYLENE GLYCOL 3350 17 G/17G
17 POWDER, FOR SOLUTION ORAL
Qty: 510 | Refills: 0
Start: 2020-09-11 | End: 2020-10-10

## 2020-09-11 RX ORDER — ACETAMINOPHEN 500 MG
2 TABLET ORAL
Qty: 240 | Refills: 0
Start: 2020-09-11 | End: 2020-10-10

## 2020-09-11 RX ADMIN — GABAPENTIN 100 MILLIGRAM(S): 400 CAPSULE ORAL at 15:47

## 2020-09-11 RX ADMIN — HEPARIN SODIUM 5000 UNIT(S): 5000 INJECTION INTRAVENOUS; SUBCUTANEOUS at 05:07

## 2020-09-11 RX ADMIN — Medication 975 MILLIGRAM(S): at 05:07

## 2020-09-11 RX ADMIN — LIDOCAINE 1 PATCH: 4 CREAM TOPICAL at 00:10

## 2020-09-11 RX ADMIN — GABAPENTIN 100 MILLIGRAM(S): 400 CAPSULE ORAL at 05:07

## 2020-09-11 RX ADMIN — Medication 975 MILLIGRAM(S): at 15:46

## 2020-09-11 RX ADMIN — LIDOCAINE 1 PATCH: 4 CREAM TOPICAL at 15:46

## 2020-09-11 NOTE — PROGRESS NOTE ADULT - SUBJECTIVE AND OBJECTIVE BOX
Kamala Miranda MD  PGY 1 Department of Internal Medicine  Pager: [phone] ([facility])       Patient is a 88y old  Male who presents with a chief complaint of fall (10 Sep 2020 07:38)      SUBJECTIVE / OVERNIGHT EVENTS: Pt seen and examined. No acute overnight events. Patient is planned for discharge today with rolling walker and home PT Denies fevers, chills, CP, SOB, Abdominal pain, N/V, Constipation, Diarrhea        MEDICATIONS  (STANDING):  acetaminophen   Tablet .. 975 milliGRAM(s) Oral every 8 hours  dextrose 5%. 1000 milliLiter(s) (50 mL/Hr) IV Continuous <Continuous>  dextrose 50% Injectable 12.5 Gram(s) IV Push once  dextrose 50% Injectable 25 Gram(s) IV Push once  dextrose 50% Injectable 25 Gram(s) IV Push once  gabapentin 100 milliGRAM(s) Oral three times a day  heparin   Injectable 5000 Unit(s) SubCutaneous every 8 hours  influenza   Vaccine 0.5 milliLiter(s) IntraMuscular once  insulin lispro (HumaLOG) corrective regimen sliding scale   SubCutaneous three times a day before meals  insulin lispro (HumaLOG) corrective regimen sliding scale   SubCutaneous at bedtime  lidocaine   Patch 1 Patch Transdermal every 24 hours  polyethylene glycol 3350 17 Gram(s) Oral daily  senna 2 Tablet(s) Oral at bedtime    MEDICATIONS  (PRN):  dextrose 40% Gel 15 Gram(s) Oral once PRN Blood Glucose LESS THAN 70 milliGRAM(s)/deciliter  glucagon  Injectable 1 milliGRAM(s) IntraMuscular once PRN Glucose LESS THAN 70 milligrams/deciliter          CAPILLARY BLOOD GLUCOSE      POCT Blood Glucose.: 96 mg/dL (10 Sep 2020 21:10)  POCT Blood Glucose.: 72 mg/dL (10 Sep 2020 17:24)  POCT Blood Glucose.: 105 mg/dL (10 Sep 2020 12:14)  POCT Blood Glucose.: 98 mg/dL (10 Sep 2020 08:25)      PHYSICAL EXAM:    Vital Signs Last 24 Hrs  T(C): 36.4 (11 Sep 2020 05:10), Max: 36.4 (10 Sep 2020 12:38)  T(F): 97.6 (11 Sep 2020 05:10), Max: 97.6 (10 Sep 2020 21:08)  HR: 65 (11 Sep 2020 05:10) (60 - 69)  BP: 105/67 (11 Sep 2020 05:10) (104/64 - 134/55)  BP(mean): --  RR: 16 (11 Sep 2020 05:10) (16 - 17)  SpO2: 100% (11 Sep 2020 05:10) (99% - 100%)    GENERAL: NAD, sitting in bed comfortably eating breakfast   HEAD:  Atraumatic, Normocephalic  EYES: EOMI,   CHEST/LUNG: Clear to auscultation bilaterally; No wheeze  HEART: Regular rate and rhythm; No murmurs, rubs, or gallops  ABDOMEN: Soft, Nontender, Nondistended; Bowel sounds present  EXTREMITIES:  2+ Peripheral Pulses, No clubbing, cyanosis, or edema  PSYCH: AAOx3  NEUROLOGY: non-focal  SKIN: (+) hyperpigmentation of LE skin b/l (venous stasis)     LABS:    Lab holiday

## 2020-09-11 NOTE — PROGRESS NOTE ADULT - PROBLEM SELECTOR PLAN 7
Diet: DASH and consistent carb diet    DVT ppx: HSQ    Disposition: Discharge home wit home PT and rolling walker

## 2020-09-11 NOTE — PROGRESS NOTE ADULT - PROBLEM SELECTOR PROBLEM 1
Fall, initial encounter

## 2020-09-11 NOTE — PROGRESS NOTE ADULT - PROBLEM SELECTOR PROBLEM 3
Other elevated white blood cell (WBC) count

## 2020-09-11 NOTE — PROGRESS NOTE ADULT - PROBLEM SELECTOR PROBLEM 4
RONDA (acute kidney injury)

## 2020-09-11 NOTE — PROGRESS NOTE ADULT - ATTENDING COMMENTS
88M DM type 2 - A1c 6.3, OA, multiple falls recently, admission to [facility] for falls, sent home p/w recurrance of fall c/b RONDA, unsafe disposition at home, unable to take care of ADLs.  Likely due to advancing OA - lidocaine patch for pain control.  PT and SW eval for safe dispoistion.  Monitor Crt - likely has CKD at baseline from DM type 2.    Patient medically optimized for discharge once safe discharge planning is made.  Discharge planning 40 minutes - discussed with patient and consultants.
88M DM type 2 - A1c 6.3, OA, multiple falls recently, admission to [facility] for falls, sent home p/w recurrence of fall c/b RONDA, unsafe disposition at home, unable to take care of ADLs.  Likely due to advancing OA - lidocaine patch for pain control.  Dispo to [facility] on 9/8.  COVID neg.  Patient medically optimized for discharge. Discharge planning 40 minutes - discussed with patient and consultants.    Discharge delayed - awaiting authorization from insurance company.  Left message for call back from [facility] for [id number] discussion with medical director for approval today.
88M DM type 2 - A1c 6.3, OA, multiple falls recently, admission to [facility] for falls, sent home p/w recurrence of fall c/b RONDA, unsafe disposition at home, unable to take care of ADLs.  Likely due to advancing OA - lidocaine patch for pain control.  Dispo to Home.  COVID neg.   Patient medically optimized for discharge. Discharge planning 40 minutes - discussed with patient and consultants. However family is concerned about discharge planning due to falls. He will be provided with RW and as per PT note, able to ambulate 75 feet without support.  CM/SW working with family for assurance for home safety.
88M DM type 2 - A1c 6.3, OA, multiple falls recently, admission to [facility] for falls, sent home p/w recurrence of fall c/b RONDA, unsafe disposition at home, unable to take care of ADLs.  Likely due to advancing OA - lidocaine patch for pain control.  Dispo to [facility].  COVID swab on monday.  Patient medically optimized for discharge. Discharge planning 40 minutes - discussed with patient and consultants.
88M DM type 2 - A1c 6.3, OA, multiple falls recently, admission to [facility] for falls, sent home p/w recurrence of fall c/b RONDA, unsafe disposition at home, unable to take care of ADLs.  Likely due to advancing OA - lidocaine patch for pain control.  Dispo to [facility] on 9/8.  COVID neg.  Patient medically optimized for discharge. Discharge planning 40 minutes - discussed with patient and consultants.
88M T2DM, OA, multiple falls recently, admission to [facility] for falls, sent home p/w recurrence of fall c/b RONDA, unsafe disposition at home, unable to take care of ADLs.  Likely due to advancing OA - lidocaine patch for pain control.  Dispo to Home.  COVID neg.     I spent 35 minutes counseling this patient and coordinating their discharge.
Patient seen and examined by myself , case discussed  with resident ,agree with the above finding and plan  Mr. Castellano is an 89 yo man w/ hx of well controlled DM type 2, OA, and multiple falls admitted s/p recurrent fall, found to have RONDA, unsafe disposition back to his residence since he is unable to take care of ADLs.  Likely due to advancing OA - lidocaine patch for pain control.   -pt c/o burning pain and numbness in RLE ,    - c/w t standing acetaminophen 975mg q8h, will add gabapentin for neuropathic pain as pt also with hx of DM   - give bisacodyl suppository for constipation  Dispo to [facility].  COVID swab on monday, 9/7/20.      Tom Molina MD   Pager  # [phone]
Mr. Castellano is an 89 yo man w/ hx of well controlled DM type 2, OA, and multiple falls admitted s/p recurrent fall, found to have RONDA, unsafe disposition back to his residence since he is unable to take care of ADLs.  Likely due to advancing OA - lidocaine patch for pain control.    - start standing acetaminophen 975mg q8h  - give bisacodyl suppository for constipation  Dispo to [facility].  COVID swab on monday, 9/7/20.  Patient medically optimized for discharge
Patient seen and examined by myself , case discussed  with resident ,agree with the above finding and plan  Mr. Castellano is an 87 yo man w/ hx of well controlled DM type 2, OA, and multiple falls admitted s/p recurrent fall, found to have RONDA, unsafe disposition back to his residence since he is unable to take care of ADLs.  Likely due to advancing OA - lidocaine patch for pain control.    - c/w t standing acetaminophen 975mg q8h  - give bisacodyl suppository for constipation  Dispo to [facility].  COVID swab on monday, 9/7/20.  Patient medically optimized for discharge

## 2020-09-11 NOTE — PROGRESS NOTE ADULT - ASSESSMENT
88 y.o M with PMH of diabetes and OA presents with mechanical fall in the setting of OA now admitted to hospital for falls - safe disposition planning, plan to discharge to [facility] Tuesday Sept 8
88 y.o M with PMH of diabetes and OA presents with mechanical fall in the setting of OA now admitted to hospital for falls - safe disposition planning, plan to discharge to [facility] Tuesday Sept 8
88 y.o M with PMH of diabetes and OA presents with mechanical fall in the setting of OA now admitted to hospital for falls - safe disposition planning, plan to discharge to [facility]
88 y.o M with PMH of diabetes and OA presents with mechanical fall in the setting of OA now admitted to hospital for falls - safe disposition planning.

## 2020-09-11 NOTE — PROGRESS NOTE ADULT - PROBLEM SELECTOR PROBLEM 5
Venous stasis
Paget's disease of bone
Venous stasis

## 2020-10-12 NOTE — DISCHARGE NOTE NURSING/CASE MANAGEMENT/SOCIAL WORK - NSTRANSFERBELONGINGSDISPO_GEN_A_NUR
not applicable Pain Refusal Text: I offered to prescribe pain medication but the patient refused to take this medication.

## 2021-03-12 ENCOUNTER — OUTPATIENT (OUTPATIENT)
Dept: OUTPATIENT SERVICES | Facility: HOSPITAL | Age: 86
LOS: 1 days | End: 2021-03-12
Payer: MEDICARE

## 2021-03-12 DIAGNOSIS — Z00.00 ENCOUNTER FOR GENERAL ADULT MEDICAL EXAMINATION WITHOUT ABNORMAL FINDINGS: ICD-10-CM

## 2021-03-12 DIAGNOSIS — E04.2 NONTOXIC MULTINODULAR GOITER: ICD-10-CM

## 2021-03-12 DIAGNOSIS — Z87.81 PERSONAL HISTORY OF (HEALED) TRAUMATIC FRACTURE: Chronic | ICD-10-CM

## 2021-03-12 PROCEDURE — 76536 US EXAM OF HEAD AND NECK: CPT

## 2021-03-12 PROCEDURE — 76536 US EXAM OF HEAD AND NECK: CPT | Mod: 26

## 2021-04-16 NOTE — PATIENT PROFILE ADULT - NSPROPTRIGHTCAREGIVER_GEN_A_NUR
S: above noted    1 week status post LPI OS  No complaints    Gtts:  Prednisolone Acetate 4x/day OS     POH:   Iridotomy/iridectomy by laser Right 03/26/2021    Dr RAVI Bettencourt   • Iridotomy/iridectomy by laser Left 04/09/2021    LPI Left eye by TPH   • Removal of sperm duct(s)  1993         SLE :       L/L/L:  OD: nl //OS: nl       CONJ:  OD: clear  //OS: clear          CORNEA:  OD: clear  //OS: clear       AC:  OD: d,q and angle Gr II-III, DEEPER**  // OS: d,q and angle Gr II-III, DEEPER**       IRIS:  OD: clear and patent PI x 1 @ 0900 //OS: clear and patent PI x 1 @ 0300       LENS:  OD: 1-2+ NS  //OS: 1-2+ NS    C/D  0.3 /0.3, HEALTHY ONs **        A:  see diagnosis below; tech note reviewed    P:  see orders/disposition    COMMENT: conditions stable, observation reccommended and questions answered    -- looks good OU**  Discontinue steroid       yes

## 2021-08-31 ENCOUNTER — EMERGENCY (EMERGENCY)
Facility: HOSPITAL | Age: 86
LOS: 1 days | Discharge: HOME | End: 2021-08-31
Attending: EMERGENCY MEDICINE | Admitting: EMERGENCY MEDICINE
Payer: MEDICARE

## 2021-08-31 VITALS
HEART RATE: 70 BPM | SYSTOLIC BLOOD PRESSURE: 143 MMHG | DIASTOLIC BLOOD PRESSURE: 73 MMHG | RESPIRATION RATE: 17 BRPM | TEMPERATURE: 98 F | OXYGEN SATURATION: 100 %

## 2021-08-31 VITALS
OXYGEN SATURATION: 98 % | RESPIRATION RATE: 20 BRPM | HEIGHT: 66 IN | HEART RATE: 69 BPM | DIASTOLIC BLOOD PRESSURE: 70 MMHG | SYSTOLIC BLOOD PRESSURE: 140 MMHG | TEMPERATURE: 98 F

## 2021-08-31 DIAGNOSIS — Z87.81 PERSONAL HISTORY OF (HEALED) TRAUMATIC FRACTURE: Chronic | ICD-10-CM

## 2021-08-31 LAB
ALBUMIN SERPL ELPH-MCNC: 4.5 G/DL — SIGNIFICANT CHANGE UP (ref 3.3–5)
ALP SERPL-CCNC: 88 U/L — SIGNIFICANT CHANGE UP (ref 40–120)
ALT FLD-CCNC: 14 U/L — SIGNIFICANT CHANGE UP (ref 4–41)
ANION GAP SERPL CALC-SCNC: 11 MMOL/L — SIGNIFICANT CHANGE UP (ref 7–14)
AST SERPL-CCNC: 19 U/L — SIGNIFICANT CHANGE UP (ref 4–40)
BASOPHILS # BLD AUTO: 0.04 K/UL — SIGNIFICANT CHANGE UP (ref 0–0.2)
BASOPHILS NFR BLD AUTO: 0.4 % — SIGNIFICANT CHANGE UP (ref 0–2)
BILIRUB SERPL-MCNC: 0.4 MG/DL — SIGNIFICANT CHANGE UP (ref 0.2–1.2)
BUN SERPL-MCNC: 17 MG/DL — SIGNIFICANT CHANGE UP (ref 7–23)
CALCIUM SERPL-MCNC: 9.5 MG/DL — SIGNIFICANT CHANGE UP (ref 8.4–10.5)
CHLORIDE SERPL-SCNC: 104 MMOL/L — SIGNIFICANT CHANGE UP (ref 98–107)
CO2 SERPL-SCNC: 26 MMOL/L — SIGNIFICANT CHANGE UP (ref 22–31)
CREAT SERPL-MCNC: 1.4 MG/DL — HIGH (ref 0.5–1.3)
CRP SERPL-MCNC: <4 MG/L — SIGNIFICANT CHANGE UP
EOSINOPHIL # BLD AUTO: 0.38 K/UL — SIGNIFICANT CHANGE UP (ref 0–0.5)
EOSINOPHIL NFR BLD AUTO: 3.9 % — SIGNIFICANT CHANGE UP (ref 0–6)
ERYTHROCYTE [SEDIMENTATION RATE] IN BLOOD: 13 MM/HR — SIGNIFICANT CHANGE UP (ref 1–15)
GLUCOSE SERPL-MCNC: 85 MG/DL — SIGNIFICANT CHANGE UP (ref 70–99)
HCT VFR BLD CALC: 44.6 % — SIGNIFICANT CHANGE UP (ref 39–50)
HGB BLD-MCNC: 14.7 G/DL — SIGNIFICANT CHANGE UP (ref 13–17)
IANC: 5.81 K/UL — SIGNIFICANT CHANGE UP (ref 1.5–8.5)
IMM GRANULOCYTES NFR BLD AUTO: 0.5 % — SIGNIFICANT CHANGE UP (ref 0–1.5)
LYMPHOCYTES # BLD AUTO: 2.74 K/UL — SIGNIFICANT CHANGE UP (ref 1–3.3)
LYMPHOCYTES # BLD AUTO: 28.4 % — SIGNIFICANT CHANGE UP (ref 13–44)
MCHC RBC-ENTMCNC: 32 PG — SIGNIFICANT CHANGE UP (ref 27–34)
MCHC RBC-ENTMCNC: 33 GM/DL — SIGNIFICANT CHANGE UP (ref 32–36)
MCV RBC AUTO: 97.2 FL — SIGNIFICANT CHANGE UP (ref 80–100)
MONOCYTES # BLD AUTO: 0.63 K/UL — SIGNIFICANT CHANGE UP (ref 0–0.9)
MONOCYTES NFR BLD AUTO: 6.5 % — SIGNIFICANT CHANGE UP (ref 2–14)
NEUTROPHILS # BLD AUTO: 5.81 K/UL — SIGNIFICANT CHANGE UP (ref 1.8–7.4)
NEUTROPHILS NFR BLD AUTO: 60.3 % — SIGNIFICANT CHANGE UP (ref 43–77)
NRBC # BLD: 0 /100 WBCS — SIGNIFICANT CHANGE UP
NRBC # FLD: 0 K/UL — SIGNIFICANT CHANGE UP
NT-PROBNP SERPL-SCNC: 137 PG/ML — SIGNIFICANT CHANGE UP
PLATELET # BLD AUTO: 242 K/UL — SIGNIFICANT CHANGE UP (ref 150–400)
POTASSIUM SERPL-MCNC: 5 MMOL/L — SIGNIFICANT CHANGE UP (ref 3.5–5.3)
POTASSIUM SERPL-SCNC: 5 MMOL/L — SIGNIFICANT CHANGE UP (ref 3.5–5.3)
PROT SERPL-MCNC: 7.6 G/DL — SIGNIFICANT CHANGE UP (ref 6–8.3)
RBC # BLD: 4.59 M/UL — SIGNIFICANT CHANGE UP (ref 4.2–5.8)
RBC # FLD: 12.4 % — SIGNIFICANT CHANGE UP (ref 10.3–14.5)
SODIUM SERPL-SCNC: 141 MMOL/L — SIGNIFICANT CHANGE UP (ref 135–145)
WBC # BLD: 9.65 K/UL — SIGNIFICANT CHANGE UP (ref 3.8–10.5)
WBC # FLD AUTO: 9.65 K/UL — SIGNIFICANT CHANGE UP (ref 3.8–10.5)

## 2021-08-31 PROCEDURE — 73630 X-RAY EXAM OF FOOT: CPT | Mod: 26,LT

## 2021-08-31 PROCEDURE — 99284 EMERGENCY DEPT VISIT MOD MDM: CPT

## 2021-08-31 NOTE — PROVIDER CONTACT NOTE (OTHER) - ASSESSMENT
Met with pt, he is alert, oriented x3, ambulatory with no assistance, asked to call a taxi. I arranged [employer] [phone], pt agreed to pay $29 cash to the . He said he will wait out side on the Bench. Taxi will call him at his cell at [phone].

## 2021-08-31 NOTE — ED ADULT NURSE NOTE - INTERVENTIONS DEFINITIONS
[place] to call system/Room bathroom lighting operational/Non-slip footwear when patient is off stretcher

## 2021-08-31 NOTE — ED ADULT NURSE NOTE - CAS EDN DISCHARGE INTERVENTIONS
details… IV discontinued, cath removed intact detailed exam no joint swelling/no joint erythema/no calf tenderness/no joint warmth

## 2021-08-31 NOTE — ED ADULT NURSE NOTE - CHIEF COMPLAINT QUOTE
Reason for call:  Patient reporting a symptom    Symptom or request: [facility] calling to discuss recurrent UTI's.  [facility] is going to fax orders for lab, patient will be seen on 2.19    Duration (how long have symptoms been present):     Have you been treated for this before?     Additional comments:     Phone Number patient can be reached at:  Other phone number:  *[phone]    Best Time:      Can we leave a detailed message on this number:  YES    Call taken on 2/12/2018 at 11:54 AM by Cristy Beltran    
pt from home coming by EMS for [place]. pedal edema started interm few days ago, L > R, denies fever, no SOB,

## 2021-08-31 NOTE — ED PROVIDER NOTE - NSFOLLOWUPINSTRUCTIONS_ED_ALL_ED_FT
Please follow up with your PCP as we discussed.     Edema    WHAT YOU NEED TO KNOW:    What is edema? Edema is swelling throughout your body. Edema is usually a sign that you are retaining fluid. The swelling may be caused by heart failure or kidney, thyroid, or liver disease. It may also be caused by medicines such as antidepressants, blood pressure medicines, or hormones. Sudden swelling around the lips or face may be a sign of a severe allergic reaction. Swelling of an arm or leg may be caused by blockage of your veins.    What other signs and symptoms may occur with edema?   •Discomfort or tenderness in the swollen areas      •Tight and shiny skin over the swollen areas      •Weight gain      How is edema diagnosed? Your healthcare provider will ask about your symptoms and any other symptoms you have. He may also ask about any medical conditions you have. Your healthcare provider will examine your skin over the swollen areas. He may gently push on the swollen area for a short time to see if this leaves a dimple. He may also order tests to find the cause of your edema.    How is edema treated and managed? Treatment for edema depends on the cause. Depending on your medical condition, you may be given medicine to help get rid of extra body fluid. Your healthcare provider may suggest that you do any of the following to help manage edema:  •Elevate your arms or legs as directed. Raise them above the level of your heart as often as you can. This will help decrease swelling and pain. Prop them on pillows or blankets to keep them elevated comfortably.         Elevate Leg           •Wear pressure stockings as directed. The stockings are tight and put pressure on your legs. This helps to keep fluid from collecting in your legs or ankles.  Pressure Stockings            •Limit your salt intake. Salt causes your body to hold water. Ask about any other changes to your diet.             •Stay active. Do not stand or sit for long periods of time. Ask your healthcare provider about the best exercise plan for you.  [employer] Walking for Exercise           •Keep your skin moist using lotion, cream, or ointment. Ask your healthcare provider what to use and how often to use it.      When should I seek immediate care?   •You have shortness of breath at rest, especially when you lie down.      •You cough up pink, foamy sputum.      •You have chest pain.      •Your heartbeat is fast or not even.      When should I call my doctor?   •The swollen area feels cold and is pale or blue in color.      •The swollen area feels warm, painful, and is red in color.      •You have increased swelling or swelling in other parts of your body.      •You have questions or concerns about your condition or care.

## 2021-08-31 NOTE — ED PROVIDER NOTE - OBJECTIVE STATEMENT
88 y/o male with pmhx of DM presenting with b/l LE edema. Patient has had chronic LE edema for many months but last night noticed clear fluid coming out of area of dry skin on LLE; denies purulent drainage. Denies chest pain, SOB, palpitations, fevers/chills, n/v/d, cough, rashes, or changes in urination. Reports intermittent associated itchiness but denies any pain in b/l LE. No recent travel/surgeries/bed bound nature. Vaccinated against COVID. No known hx of CHF.

## 2021-08-31 NOTE — ED PROVIDER NOTE - ATTENDING CONTRIBUTION TO CARE
MD Briggs:  patient seen and evaluated with the resident.  I was present for key portions of the History & Physical, and I agree with the Impression & Plan.  MD Briggs:  88 yo M c/o L foot weepy edema.   Duration: 1wk  Quality: clear drainage.   Context:  patient has chronic BLE edema.  Reports that over the last wk his L foot edema has slightly increased and his sock was damp, so he came to the ED for evaluation.  Associated Sx:  No pain.  No erythema.  No fever/chills  Gen:  Well appearing in NAD  Head:  NC/AT  Resp: No distress   Ext: BLE edema (L slightly more than R), nontender to palpation, no warmth, no purulence.    Skin:  color changes are symmetric, and there is no erythema.   Impression:  dependent edema w/o clinical evidence of cellulitis or DVT.   Plan:  labs, xray, reassess. Empiric Abx not indicated at this time.

## 2021-08-31 NOTE — ED ADULT TRIAGE NOTE - CHIEF COMPLAINT QUOTE
pt from home coming by EMS for placido. pedal edema started interm few days ago, L > R, denies fever, no SOB,

## 2021-08-31 NOTE — ED PROVIDER NOTE - PATIENT PORTAL LINK FT
You can access the FollowMyHealth Patient Portal offered by [facility] by registering at the following website: [email or web address]. By joining [facility]’s FollowMyHealth portal, you will also be able to view your health information using other applications (apps) compatible with our system.

## 2021-08-31 NOTE — ED PROVIDER NOTE - PHYSICAL EXAMINATION
Gen: WDWN, NAD, comfortable in bed   HEENT: EOMI, no nasal discharge, mucous membranes moist  CV: RRR, +S1/S2, no M/R/G  Resp: CTAB, no W/R/R  GI: Abdomen soft non-distended, NTTP, no masses  MSK: No open wounds, no bruising, B/l LE pitting edema with overlying stasis changes (most significant on dorsal surface of left foot with intermittent fluid filled blisters; no erythema and not warm to touch) with no calf tenderness, no open wounds   Neuro: A&Ox4, following commands, moving all four extremities spontaneously  Psych: appropriate mood

## 2021-08-31 NOTE — ED PROVIDER NOTE - CLINICAL SUMMARY MEDICAL DECISION MAKING FREE TEXT BOX
88 y/o male with pmhx of DM presenting with b/l LE edema, no SOB or increased WOB, no calf tenderness, LLE stasis changes with fluid blistering. Xray of LLE for low suspicion of cellulitis. Basics including BNP for low suspicion of CHF.

## 2021-08-31 NOTE — ED ADULT NURSE NOTE - OBJECTIVE STATEMENT
received patient in bed 26. Patient is a&ox4 ambulatory at baseline. Patient denies any chest pain , sob, n/v, diarrhea. Patient c/o of bilateral received patient in bed 26. Patient is a&ox4 ambulatory at baseline. Patient denies any chest pain , sob, n/v, diarrhea. Patient c/o of bilateral leg swelling. Patient has present pedal pulses, patient has no pain at the legs but "itchness" at site. Patient vital signs stable.

## 2022-02-16 NOTE — ED PROVIDER NOTE - NS ED ROS FT
Gen: Denies fever, weight loss  CV: Denies chest pain, palpitations  Skin: Denies rash, erythema, color changes  Resp: Denies SOB, cough  Endo: Denies sensitivity to heat, cold, increased urination  GI: Denies diarrhea, constipation, nausea, vomiting  Msk: Denies back pain, extremity pain  : Denies dysuria, increased frequency  Neuro: Denies LOC, weakness, numbness/tingling Robotic assisted supracervical Hysterectomy bilateral salpingo oophorectomy Sacrocolpopexy anterior ans posterior repair mini sling cystoscopy by dr. Cordova on 3/2/22. Covid vaccine series completed, card in chart, covid test is on 2/27/22. Medical and cardiac clearance pending

## 2022-06-20 NOTE — PATIENT PROFILE ADULT - CENTRAL VENOUS CATHETER/PICC LINE
no [0] : 2) Feeling down, depressed, or hopeless: Not at all (0) [No Increased risk of SCA or SCD] : No Increased risk of SCA or SCD    [[id number]] : 0 [[id number]] : 0 [Have you ever fainted, passed out or had an unexplained seizure suddenly and without warning, especially during exercise or in response] : Have you ever fainted, passed out or had an unexplained seizure suddenly and without warning, especially during exercise or in response to sudden loud noises such as doorbells, alarm clocks and ringing telephones? No [Have you ever had exercise-related chest pain or shortness of breath?] : Have you ever had exercise-related chest pain or shortness of breath? No [Has anyone in your immediate family (parents, grandparents, siblings) or other more distant relatives (aunts, uncles, cousins)  of heart] : Has anyone in your immediate family (parents, grandparents, siblings) or other more distant relatives (aunts, uncles, cousins)  of heart problems or had an unexpected sudden death before age 50 (This would include unexpected drownings, unexplained car accidents in which the relative was driving or sudden infant death syndrome.)? No [Are you related to anyone with hypertrophic cardiomyopathy or hypertrophic obstructive cardiomyopathy, Marfan syndrome, arrhythmogenic] : Are you related to anyone with hypertrophic cardiomyopathy or hypertrophic obstructive cardiomyopathy, Marfan syndrome, arrhythmogenic right ventricular cardiomyopathy, long QT syndrome, short QT syndrome, Brugada syndrome or catecholaminergic polymorphic ventricular tachycardia, or anyone younger than 50 years with a pacemaker or implantable defibrillator? No

## 2022-06-27 ENCOUNTER — EMERGENCY (EMERGENCY)
Facility: HOSPITAL | Age: 87
LOS: 1 days | Discharge: HOME | End: 2022-06-27
Attending: STUDENT IN AN ORGANIZED HEALTH CARE EDUCATION/TRAINING PROGRAM | Admitting: STUDENT IN AN ORGANIZED HEALTH CARE EDUCATION/TRAINING PROGRAM
Payer: MEDICARE

## 2022-06-27 VITALS
HEIGHT: 66 IN | DIASTOLIC BLOOD PRESSURE: 61 MMHG | RESPIRATION RATE: 16 BRPM | HEART RATE: 73 BPM | TEMPERATURE: 98 F | SYSTOLIC BLOOD PRESSURE: 125 MMHG | OXYGEN SATURATION: 100 %

## 2022-06-27 DIAGNOSIS — Z87.81 PERSONAL HISTORY OF (HEALED) TRAUMATIC FRACTURE: Chronic | ICD-10-CM

## 2022-06-27 LAB
ALBUMIN SERPL ELPH-MCNC: 4.3 G/DL — SIGNIFICANT CHANGE UP (ref 3.3–5)
ALP SERPL-CCNC: 104 U/L — SIGNIFICANT CHANGE UP (ref 40–120)
ALT FLD-CCNC: 24 U/L — SIGNIFICANT CHANGE UP (ref 4–41)
ANION GAP SERPL CALC-SCNC: 11 MMOL/L — SIGNIFICANT CHANGE UP (ref 7–14)
APTT BLD: 30.6 SEC — SIGNIFICANT CHANGE UP (ref 27–36.3)
AST SERPL-CCNC: 20 U/L — SIGNIFICANT CHANGE UP (ref 4–40)
BASOPHILS # BLD AUTO: 0.03 K/UL — SIGNIFICANT CHANGE UP (ref 0–0.2)
BASOPHILS NFR BLD AUTO: 0.3 % — SIGNIFICANT CHANGE UP (ref 0–2)
BILIRUB SERPL-MCNC: 0.5 MG/DL — SIGNIFICANT CHANGE UP (ref 0.2–1.2)
BUN SERPL-MCNC: 21 MG/DL — SIGNIFICANT CHANGE UP (ref 7–23)
CALCIUM SERPL-MCNC: 9.7 MG/DL — SIGNIFICANT CHANGE UP (ref 8.4–10.5)
CHLORIDE SERPL-SCNC: 102 MMOL/L — SIGNIFICANT CHANGE UP (ref 98–107)
CO2 SERPL-SCNC: 26 MMOL/L — SIGNIFICANT CHANGE UP (ref 22–31)
CREAT SERPL-MCNC: 1.37 MG/DL — HIGH (ref 0.5–1.3)
EGFR: 49 ML/MIN/1.73M2 — LOW
EOSINOPHIL # BLD AUTO: 0.82 K/UL — HIGH (ref 0–0.5)
EOSINOPHIL NFR BLD AUTO: 8.1 % — HIGH (ref 0–6)
GLUCOSE SERPL-MCNC: 101 MG/DL — HIGH (ref 70–99)
HCT VFR BLD CALC: 46.2 % — SIGNIFICANT CHANGE UP (ref 39–50)
HGB BLD-MCNC: 15.1 G/DL — SIGNIFICANT CHANGE UP (ref 13–17)
IANC: 5.56 K/UL — SIGNIFICANT CHANGE UP (ref 1.8–7.4)
IMM GRANULOCYTES NFR BLD AUTO: 0.6 % — SIGNIFICANT CHANGE UP (ref 0–1.5)
INR BLD: 0.97 RATIO — SIGNIFICANT CHANGE UP (ref 0.88–1.16)
LYMPHOCYTES # BLD AUTO: 2.91 K/UL — SIGNIFICANT CHANGE UP (ref 1–3.3)
LYMPHOCYTES # BLD AUTO: 28.6 % — SIGNIFICANT CHANGE UP (ref 13–44)
MCHC RBC-ENTMCNC: 32.7 GM/DL — SIGNIFICANT CHANGE UP (ref 32–36)
MCHC RBC-ENTMCNC: 32.9 PG — SIGNIFICANT CHANGE UP (ref 27–34)
MCV RBC AUTO: 100.7 FL — HIGH (ref 80–100)
MONOCYTES # BLD AUTO: 0.78 K/UL — SIGNIFICANT CHANGE UP (ref 0–0.9)
MONOCYTES NFR BLD AUTO: 7.7 % — SIGNIFICANT CHANGE UP (ref 2–14)
NEUTROPHILS # BLD AUTO: 5.56 K/UL — SIGNIFICANT CHANGE UP (ref 1.8–7.4)
NEUTROPHILS NFR BLD AUTO: 54.7 % — SIGNIFICANT CHANGE UP (ref 43–77)
NRBC # BLD: 0 /100 WBCS — SIGNIFICANT CHANGE UP
NRBC # FLD: 0 K/UL — SIGNIFICANT CHANGE UP
NT-PROBNP SERPL-SCNC: 135 PG/ML — SIGNIFICANT CHANGE UP
PLATELET # BLD AUTO: 210 K/UL — SIGNIFICANT CHANGE UP (ref 150–400)
POTASSIUM SERPL-MCNC: 4.9 MMOL/L — SIGNIFICANT CHANGE UP (ref 3.5–5.3)
POTASSIUM SERPL-SCNC: 4.9 MMOL/L — SIGNIFICANT CHANGE UP (ref 3.5–5.3)
PROT SERPL-MCNC: 7.7 G/DL — SIGNIFICANT CHANGE UP (ref 6–8.3)
PROTHROM AB SERPL-ACNC: 11.2 SEC — SIGNIFICANT CHANGE UP (ref 10.5–13.4)
RBC # BLD: 4.59 M/UL — SIGNIFICANT CHANGE UP (ref 4.2–5.8)
RBC # FLD: 12.6 % — SIGNIFICANT CHANGE UP (ref 10.3–14.5)
SODIUM SERPL-SCNC: 139 MMOL/L — SIGNIFICANT CHANGE UP (ref 135–145)
TROPONIN T, HIGH SENSITIVITY RESULT: 27 NG/L — SIGNIFICANT CHANGE UP
WBC # BLD: 10.16 K/UL — SIGNIFICANT CHANGE UP (ref 3.8–10.5)
WBC # FLD AUTO: 10.16 K/UL — SIGNIFICANT CHANGE UP (ref 3.8–10.5)

## 2022-06-27 PROCEDURE — 93970 EXTREMITY STUDY: CPT | Mod: 26

## 2022-06-27 PROCEDURE — 93010 ELECTROCARDIOGRAM REPORT: CPT

## 2022-06-27 PROCEDURE — 99285 EMERGENCY DEPT VISIT HI MDM: CPT | Mod: 25

## 2022-06-27 RX ADMIN — Medication 110 MILLIGRAM(S): at 21:37

## 2022-06-27 NOTE — ED ADULT NURSE NOTE - NSFALLRSKASSESSTYPE_ED_ALL_ED
Otc Regimen: Begin moisturizing with a non-fragrance moisturizing cream Discontinue Regimen: Aldara Apply to molluscum once daily every other night for 6 weeks then Discontinue Detail Level: Simple Otc Regimen: Moisturize with cereve cream BID\\nDiscontinue oitnments Initiate Treatment: Elidel 1% apply to arms and legs and face twice daily for 2 weeks Initial (On Arrival)

## 2022-06-27 NOTE — ED PROVIDER NOTE - PATIENT PORTAL LINK FT
You can access the FollowMyHealth Patient Portal offered by [facility] by registering at the following website: [email or web address]. By joining [facility]’s FollowMyHealth portal, you will also be able to view your health information using other applications (apps) compatible with our system.

## 2022-06-27 NOTE — ED ADULT NURSE NOTE - OBJECTIVE STATEMENT
pt rcd to rm 12 a&ox4 c/o left leg pain. pt leg appears red, swollen, and warm to touch from left ankle to mid shin. pt able to wiggle toes and move leg full ROM. pt positive bilateral pedal pulses. pt ambulatory with cane. pt denies chest pain, sob, nausea, vomiting, dizziness, headache. pt respirations even and unlabored with no accessory muscle use. abdomen soft nondistended. pt skin in tact. 20g to the RAC. labs collected and sent. pt medicated as per md orders. pt pmhx t2DM. pt stable and taken to ultrasound. pt in NAD at this time.

## 2022-06-27 NOTE — ED PROVIDER NOTE - ATTENDING CONTRIBUTION TO CARE
I (Sulaiman) agree with above, I performed a history and physical. Counseled katalina medical staff, physician assistant, and/or medical student on medical decision making as documented. Medical decisions and treatment interventions were made in real time during the patient encounter. Additionally and/or with the following exceptions: The patient presented to the ED with left lower extremity swelling, has had a history of dvt as well. No respiratory distress but lle does have some erythema and is warm to the touch. The patient was ruled out for acute dvt and acute heart failure, The patient was signed out to the oncoming attending pending the following:  repeat troponin with likely discharge home with abx if trop lateral.

## 2022-06-27 NOTE — ED PROVIDER NOTE - OBJECTIVE STATEMENT
Patient is 89y M PMHx DM, presenting with worsening left leg swelling x2 days. Patient states swelling worsening, then became hot and red, with some pruritis. Swelling worse bilaterally, L>R. Hx surgery right leg, hx DVT left leg. Not on A/c. Denies hx CHF. Denies CP, SOB, BYNUM, fevers.

## 2022-06-27 NOTE — ED PROVIDER NOTE - CLINICAL SUMMARY MEDICAL DECISION MAKING FREE TEXT BOX
Patient is 89y M PMHx DM, presenting with worsening left leg swelling x2 days. Will get labs, U/S. R/o DVT, ACS, CHF. Will give abx, likely overlying cellulitis given warmth, erythema.

## 2022-06-27 NOTE — ED ADULT TRIAGE NOTE - CHIEF COMPLAINT QUOTE
Pt brought in by EMS from home complaining of bilateral leg pain and swelling x few days. Pt denies chest pain, sob, n/v/d, fever or chills.

## 2022-06-27 NOTE — ED PROVIDER NOTE - NSFOLLOWUPINSTRUCTIONS_ED_ALL_ED_FT
You were seen in the ED for leg swelling and pain.    You had an ultrasound which showed there is no blood clot.    You most likely have an infection in the skin called cellulitis.    Please  the antibiotic at your pharmacy and take as directed.    Please follow-up with your primary care doctor.    Take Tylenol and/or Ibuprofen every 4-6 hours as needed for pain.    ***Return to the ED if you have any new or worsening symptoms such as chest pain, difficulty breathing, worsening pain, fevers, or any other concerning symptoms***

## 2022-06-27 NOTE — ED PROVIDER NOTE - PHYSICAL EXAMINATION
GENERAL: no acute distress, non-toxic appearing  HEAD: normocephalic, atraumatic  HEENT: PERRLA, EOMI  CARDIAC: regular rate and rhythm  PULM: clear to ascultation bilaterally, no crackles, rales, rhonchi, or wheezing  GI: abdomen nondistended, soft, nontender  NEURO: alert and oriented x 3, normal speech, no gross neurologic deficit  MSK: BLE edema L>R, erythema bilateral shins, warm to touch, pain to palpation, no wounds/ulcers  SKIN: no visible rashes, dry, well-perfused  PSYCH: appropriate mood and affect

## 2022-06-27 NOTE — ED ADULT NURSE NOTE - NSIMPLEMENTINTERV_GEN_ALL_ED
Implemented All Fall Risk Interventions:  [place] to call system. Call bell, personal items and telephone within reach. Instruct patient to call for assistance. Room bathroom lighting operational. Non-slip footwear when patient is off stretcher. Physically safe environment: no spills, clutter or unnecessary equipment. Stretcher in lowest position, wheels locked, appropriate side rails in place. Provide visual cue, wrist band, yellow gown, etc. Monitor gait and stability. Monitor for mental status changes and reorient to person, place, and time. Review medications for side effects contributing to fall risk. Reinforce activity limits and safety measures with patient and family.

## 2022-06-27 NOTE — ED PROVIDER NOTE - PROGRESS NOTE DETAILS
Annabelle Herring MD PGY2: Patient trop x2 stable. DVT u/s negative bilaterally. Will discharge with doxy rx. Patient aware, will f/u PCP.

## 2022-06-28 VITALS
TEMPERATURE: 98 F | DIASTOLIC BLOOD PRESSURE: 75 MMHG | OXYGEN SATURATION: 100 % | SYSTOLIC BLOOD PRESSURE: 111 MMHG | RESPIRATION RATE: 18 BRPM | HEART RATE: 70 BPM

## 2022-06-28 LAB — TROPONIN T, HIGH SENSITIVITY RESULT: 26 NG/L — SIGNIFICANT CHANGE UP

## 2022-06-28 NOTE — PROVIDER CONTACT NOTE (OTHER) - ASSESSMENT
Met with pt-pt states he cannot take a cab at this time; does not feel safe traveling via cab; is unsteady on his feet.  States he has his keys, but needs assistance getting to the door.  Pt reports he does not have money for an ambulette, though, and has no one who can pick him up.

## 2022-07-06 NOTE — PROGRESS NOTE ADULT - PROBLEM/PLAN-4
[facility] - PODIATRY    Today's Date: 22    Patient Name: Kristen Orellana  MRN: [id number]  CSN: [id number]  PCP: Provider, No Known  Referring Provider: No ref. provider found    SUBJECTIVE     Chief Complaint   Patient presents with   • Right Ankle - Pain     Fell from a ladder end of  multiple previous sprains     HPI: Kristen Orellana, a 33 y.o.female, presents to clinic.    New, Established, New Problem: New problem    Location: Right ankle    Duration: Early 2022    Onset: Acute    Nature: Fell at home from approximately 8 feet high ladder    Stable, worsening, improving: Stable, no improvement    Aggravating factors:  Patient relates pain is aggravated by shoe gear and ambulation.      Previous Treatment: NSAIDs, RICE therapy    Patient denies any fevers, chills, nausea, vomiting, shortness of breath, nor any other constitutional signs nor symptoms.    No other pedal complaints at this time.    Recent medical changes: None    Past Medical History:   Diagnosis Date   • Foot pain, bilateral      Past Surgical History:   Procedure Laterality Date   •  SECTION     • ENDOMETRIAL ABLATION     • HYSTERECTOMY     • NOSE SURGERY       Family History   Problem Relation Age of Onset   • Cancer Mother         breast   • Stroke Maternal Grandmother    • Cancer Maternal Grandmother         lung, breast   • Cancer Maternal Grandfather         lung   • Diabetes Paternal Grandmother    • Stroke Paternal Grandmother    • Cancer Paternal Grandmother         colon   • Stroke Paternal Grandfather    • Heart disease Neg Hx      Social History     Socioeconomic History   • Marital status:    Tobacco Use   • Smoking status: Current Every Day Smoker     Packs/day: 0.50     Types: Cigarettes   • Smokeless tobacco: Never Used   Vaping Use   • Vaping Use: Never used   Substance and Sexual Activity   • Alcohol use: Yes     Comment: occas   • Drug use: Never   • Sexual activity: 
Defer     Allergies   Allergen Reactions   • Norgestimate-Eth Estradiol Hives     FEVER, THROAT SWELL     Current Outpatient Medications   Medication Sig Dispense Refill   • ibuprofen (ADVIL,MOTRIN) 400 MG tablet Take 400 mg by mouth Every 8 (Eight) Hours As Needed for Mild Pain .       No current facility-administered medications for this visit.     Review of Systems   Constitutional: Negative.    Musculoskeletal:        Pain throughout right ankle.     All other systems reviewed and are negative.      OBJECTIVE     Vitals:    07/06/22 1359   BP: 121/61   Pulse: 71   Temp: 97.6 °F (36.4 °C)   SpO2: 98%       WBC   Date Value Ref Range Status   04/21/2021 7.12 4.80 - 10.80 10*3/uL Final     RBC   Date Value Ref Range Status   04/21/2021 4.96 4.20 - 5.40 10*6/uL Final     Hemoglobin   Date Value Ref Range Status   04/21/2021 14.0 12.0 - 16.0 g/dL Final     Hematocrit   Date Value Ref Range Status   04/21/2021 42.1 37.0 - 47.0 % Final     MCV   Date Value Ref Range Status   04/21/2021 87.1 81.0 - 99.0 fL Final     MCH   Date Value Ref Range Status   04/21/2021 29.0 27.0 - 31.0 pg Final     MCHC   Date Value Ref Range Status   04/21/2021 33.3 33.0 - 37.0 Final     RDW   Date Value Ref Range Status   04/21/2021 13.2 11.7 - 14.4 % Final     RDW-SD   Date Value Ref Range Status   04/21/2021 42.4 36.4 - 46.3 fL Final     MPV   Date Value Ref Range Status   04/21/2021 12.0 9.4 - 12.3 fL Final     Platelets   Date Value Ref Range Status   04/21/2021 118 (L) 130 - 400 10*3/uL Final     Neutrophil Rel %   Date Value Ref Range Status   04/21/2021 47.3 30.0 - 85.0 % Final     Lymphocyte Rel %   Date Value Ref Range Status   04/21/2021 35.5 20.0 - 45.0 % Final     Monocyte Rel %   Date Value Ref Range Status   04/21/2021 8.8 3.0 - 10.0 % Final     Eosinophil Rel %   Date Value Ref Range Status   04/21/2021 7.9 (H) 0.0 - 7.0 % Final     Basophil Rel %   Date Value Ref Range Status   04/21/2021 0.4 0.0 - 3.0 % Final     Neutrophils 
Absolute   Date Value Ref Range Status   04/21/2021 3.36 2.00 - 8.00 10*3/uL Final     Lymphocytes Absolute   Date Value Ref Range Status   04/21/2021 2.53 1.00 - 5.00 10*3/uL Final     Monocytes Absolute   Date Value Ref Range Status   04/21/2021 0.63 0.20 - 1.20 10*3/uL Final     Eosinophils Absolute   Date Value Ref Range Status   04/21/2021 0.56 0.00 - 0.70 10*3/uL Final     Basophils Absolute   Date Value Ref Range Status   04/21/2021 0.03 0.00 - 0.20 10*3/uL Final     NRBC   Date Value Ref Range Status   04/21/2021 0.00 0.00 - 0.70 % Final         Lab Results   Component Value Date    GLUCOSE 74 11/05/2019    BUN 11 11/05/2019    CREATININE 0.68 11/05/2019    BCR 16 11/05/2019    K 4.2 11/05/2019    CO2 26 11/05/2019    CALCIUM 9.4 11/05/2019    ALBUMIN 3.9 11/05/2019    LABIL2 1.4 11/05/2019    AST 22 11/05/2019    ALT 29 11/05/2019       Patient seen in no apparent distress.      PHYSICAL EXAM:     Foot/Ankle Exam:       General:   Appearance: obesity    Orientation: AAOx3    Affect: appropriate    Gait: unimpaired    Shoe Gear:  Sandals    VASCULAR      Right Foot Vascularity   Normal vascular exam    Dorsalis pedis:  2+  Posterior tibial:  2+  Skin Temperature: warm    Edema Grading:  None  CFT:  < 3 seconds  Pedal Hair Growth:  Present  Varicosities: mild varicosities       Left Foot Vascularity   Normal vascular exam    Dorsalis pedis:  2+  Posterior tibial:  2+  Skin Temperature: warm    Edema Grading:  None  CFT:  < 3 seconds  Pedal Hair Growth:  Present  Varicosities: mild varicosities        NEUROLOGIC     Right Foot Neurologic   Normal sensation    Light touch sensation:  Normal  Vibratory sensation:  Normal  Hot/Cold sensation: normal    Protective Sensation using [place] Monofilament:  10     Left Foot Neurologic   Normal sensation    Light touch sensation:  Normal  Vibratory sensation:  Normal  Hot/cold sensation: normal    Protective Sensation using [place]a Monofilament:  
10     MUSCLE STRENGTH     Right Foot Muscle Strength   Foot dorsiflexion:  4  Foot plantar flexion:  4  Foot inversion:  4  Foot eversion:  4     Left Foot Muscle Strength   Foot dorsiflexion:  4  Foot plantar flexion:  4  Foot inversion:  4  Foot eversion:  4     RANGE OF MOTION      Right Foot Range of Motion   Foot and ankle ROM within normal limits    Right ankle active dorsiflexion: No signs of edema, erythema, lymphangitis, nor signs of infection.    ankle dorsiflexion pain    plantar flexion pain    foot eversion pain    foot inversion pain       Left Foot Range of Motion   Foot and ankle ROM within normal limits       DERMATOLOGIC     Right Foot Dermatologic   Skin: skin intact    Nails: normal       Left Foot Dermatologic   Skin: skin intact    Nails: normal        RADIOLOGY:      XR Ankle 3+ View Right    Result Date: 7/6/2022  Narrative: IN-OFFICE IMAGING:  Standing, weightbearing, 3 view, AP, MO, Lateral, right ankle Indication: Fell from a ladder in June 2022 Findings: No periosteal reactions nor osteolytic changes seen.  No occult fractures seen.  Posterior and inferior calcaneal enthesopathy.  Anterior cyma line break seen. Comparison: No comparison views available.       ASSESSMENT/PLAN     Diagnoses and all orders for this visit:    1. Foot pain, right (Primary)    2. Sprain of right ankle, unspecified ligament, initial encounter    3. Closed fracture of right ankle, initial encounter  -     MRI Ankle Right Without Contrast; Future        Comprehensive lower extremity examination and evaluation was performed.    Discussed findings and treatment plan including risks, benefits, and treatment options with patient in detail. Patient agreed with treatment plan.    Medications and allergies reviewed.  Reviewed available lab values along with other pertinent labs.  These were discussed with the patient.    CAM walker applied to Right lower leg.  Patient instructed to utilize for partial-weight bearing at 
all time with CAM walker.  The patient states understanding and agreement with this plan.  Dr. Jansen advised patient may resume driving, but advised not to drive while wearing an ambulatory device.  Advised quick/hard depression of brakes could cause injury to areas.  Also advised of possible legal implications while driving in restrictive device.  The patient states understanding and agreement with these instructions.    Rice Therapy: It is important to treat any injury as soon as possible to help control swelling and increase recovery time. The recognized regimen for immediate treatment of sport injuries includes rest, ice (cold application), compression, and elevation (RICE). Remove the injured athlete from play, apply ice to the affected area, wrap or compress the injured area with an elastic bandage when appropriate, and elevate the injured area above heart level to reduce swelling.  The patient is to not use ice for longer than 20 minutes at a time, with at least 20 minutes of no ice usage between applications.  The patient states understanding and agreement with this plan.    An After Visit Summary was printed and given to the patient at discharge, including (if requested) any available informative/educational handouts regarding diagnosis, treatment, or medications. All questions were answered to patient/family satisfaction. Should symptoms fail to improve or worsen they agree to call or return to clinic or to go to the Emergency Department. Discussed the importance of following up with any needed screening tests/labs/specialist appointments and any requested follow-up recommended by me today. Importance of maintaining follow-up discussed and patient accepts that missed appointments can delay diagnosis and potentially lead to worsening of conditions.    Return in about 2 weeks (around 7/20/2022) for MRI f/u., or sooner if acute issues arise.    This document has been electronically signed by Johnson CERRATO 
RAJNI Jansen on July 6, 2022 14:42 EDT       
DISPLAY PLAN FREE TEXT

## 2022-07-19 ENCOUNTER — EMERGENCY (EMERGENCY)
Facility: HOSPITAL | Age: 87
LOS: 1 days | Discharge: HOME | End: 2022-07-19
Attending: STUDENT IN AN ORGANIZED HEALTH CARE EDUCATION/TRAINING PROGRAM | Admitting: STUDENT IN AN ORGANIZED HEALTH CARE EDUCATION/TRAINING PROGRAM

## 2022-07-19 VITALS
TEMPERATURE: 98 F | DIASTOLIC BLOOD PRESSURE: 48 MMHG | RESPIRATION RATE: 16 BRPM | SYSTOLIC BLOOD PRESSURE: 118 MMHG | OXYGEN SATURATION: 99 % | HEART RATE: 73 BPM | HEIGHT: 66 IN

## 2022-07-19 VITALS
OXYGEN SATURATION: 100 % | DIASTOLIC BLOOD PRESSURE: 71 MMHG | SYSTOLIC BLOOD PRESSURE: 142 MMHG | TEMPERATURE: 97 F | RESPIRATION RATE: 17 BRPM | HEART RATE: 80 BPM

## 2022-07-19 DIAGNOSIS — Z87.81 PERSONAL HISTORY OF (HEALED) TRAUMATIC FRACTURE: Chronic | ICD-10-CM

## 2022-07-19 PROCEDURE — 99283 EMERGENCY DEPT VISIT LOW MDM: CPT

## 2022-07-19 RX ORDER — ACETAMINOPHEN 500 MG
650 TABLET ORAL ONCE
Refills: 0 | Status: COMPLETED | OUTPATIENT
Start: 2022-07-19 | End: 2022-07-19

## 2022-07-19 RX ORDER — FUROSEMIDE 40 MG
20 TABLET ORAL ONCE
Refills: 0 | Status: COMPLETED | OUTPATIENT
Start: 2022-07-19 | End: 2022-07-19

## 2022-07-19 RX ORDER — ACETAMINOPHEN 500 MG
2 TABLET ORAL
Qty: 56 | Refills: 0
Start: 2022-07-19 | End: 2022-07-25

## 2022-07-19 RX ORDER — FUROSEMIDE 40 MG
1 TABLET ORAL
Qty: 14 | Refills: 0
Start: 2022-07-19 | End: 2022-08-01

## 2022-07-19 RX ADMIN — Medication 650 MILLIGRAM(S): at 10:27

## 2022-07-19 RX ADMIN — Medication 20 MILLIGRAM(S): at 10:28

## 2022-07-19 NOTE — ED PROVIDER NOTE - NSFOLLOWUPINSTRUCTIONS_ED_ALL_ED_FT
Take Furosemide as prescribed (20mg once a day)  Take Tylenol 650mg  every 4-6 hours as needed for pain    Follow up with Vascular specialist    Follow up with your Primary Medical Doctor within 2-3days. If results or reports were given to you, show copies of your reports given to you. Take all of your medications as previously prescribed.     If you have issues obtaining follow up, please call: [phone] to obtain a doctor or specialist who takes your insurance in your area.       This condition is also called venous stasis.      What are the causes?    Common causes of this condition include:  •High blood pressure inside the veins (venous hypertension).      •Sitting or standing too long, causing increased blood pressure in the leg veins.      •A blood clot that blocks blood flow in a vein (deep vein thrombosis, DVT).      •Inflammation of a vein (phlebitis) that causes a blood clot to form.      •Tumors in the pelvis that cause blood to back up.        What increases the risk?    The following factors may make you more likely to develop this condition:  •Having a family history of this condition.      •Obesity.      •Pregnancy.      •Living without enough regular physical activity or exercise (sedentary lifestyle).      •Smoking.      •Having a job that requires long periods of standing or sitting in one place.      •Being a certain age. Women in their 40s and 50s and men in their 70s are more likely to develop this condition.        What are the signs or symptoms?    Symptoms of this condition include:  •Veins that are enlarged, bulging, or twisted (varicose veins).      •Skin breakdown or ulcers.      •Reddened skin or dark discoloration of skin on the leg between the knee and ankle.      •Brown, smooth, tight, and painful skin just above the ankle, usually on the inside of the leg (lipodermatosclerosis).      •Swelling of the legs.        How is this diagnosed?    This condition may be diagnosed based on:  •Your medical history.      •A physical exam.    •Tests, such as:  •A procedure that creates an image of a blood vessel and nearby organs and provides information about blood flow through the blood vessel (duplex ultrasound).      •A procedure that tests blood flow (plethysmography).      •A procedure that looks at the veins using X-ray and dye (venogram).          How is this treated?  Compression stockings on a person's lower legs.   The goals of treatment are to help you return to an active life and to minimize pain or disability. Treatment depends on the severity of your condition, and it may include:  •Wearing compression stockings. These can help relieve symptoms and help prevent your condition from getting worse. However, they do not cure the condition.      •Sclerotherapy. This procedure involves an injection of a solution that shrinks damaged veins.    •Surgery. This may involve:  •Removing a diseased vein (vein stripping).      •Cutting off blood flow through the vein (laser ablation surgery).      •Repairing or reconstructing a valve within the affected vein.          Follow these instructions at home:      Couple holding hands and walking.        A comparison of three sample cups showing dark yellow, yellow, and pale yellow urine.     •Wear compression stockings as told by your health care provider. These stockings help to prevent blood clots and reduce swelling in your legs.      •Take over-the-counter and prescription medicines only as told by your health care provider.      •Stay active by exercising, walking, or doing different activities. Ask your health care provider what activities are safe for you and how much exercise you need.      •Drink enough fluid to keep your urine pale yellow.      • Do not use any products that contain nicotine or tobacco, such as cigarettes, e-cigarettes, and chewing tobacco. If you need help quitting, ask your health care provider.      •Keep all follow-up visits as told by your health care provider. This is important.        Contact a health care provider if you:    •Have redness, swelling, or more pain in the affected area.      •See a red streak or line that goes up or down from the affected area.      •Have skin breakdown or skin loss in the affected area, even if the breakdown is small.      •Get an injury in the affected area.        Get help right away if:    •You get an injury and an open wound in the affected area.    •You have:  •Severe pain that does not get better with medicine.      •Sudden numbness or weakness in the foot or ankle below the affected area.      •Trouble moving your foot or ankle.      •A fever.      •Worse or persistent symptoms.      •Chest pain.      •Shortness of breath.          Summary    •Chronic venous insufficiency is a condition where the leg veins cannot effectively pump blood from the legs to the heart.      •Chronic venous insufficiency occurs when the vein walls become stretched, weakened, or damaged, or when valves within the vein are damaged.      •Treatment depends on how severe your condition is. It often involves wearing compression stockings and may involve having a procedure.      •Make sure you stay active by exercising, walking, or doing different activities. Ask your health care provider what activities are safe for you and how much exercise you need.      This information is not intended to replace advice given to you by your health care provider. Make sure you discuss any questions you have with your health care provider.

## 2022-07-19 NOTE — ED PROVIDER NOTE - PHYSICAL EXAMINATION
B/L LE: 1+ pitting edema, with superficial areas of reddened skin and discoloration/ darkening, pulses intact, FROM of knees and ankles.   + TTP over lower legs and ankles

## 2022-07-19 NOTE — ED PROVIDER NOTE - NS ED ATTENDING STATEMENT MOD
This was a shared visit with the AMRITA. I reviewed and verified the documentation and independently performed the documented:

## 2022-07-19 NOTE — ED PROVIDER NOTE - CARE PLAN
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

## 2022-07-19 NOTE — ED ADULT NURSE NOTE - OBJECTIVE STATEMENT
Pt arriving to room 28 A&OX4 ambulatory with a cane at baseline c/o pain to bilateral feet. Pt seen in ED 3 weeks ago for similar complaints. Arriving with dressing in place. Dressings removed. No drainage noted. Swelling to BLE. Denies worsening symptoms, chills/fevers, N/V/D. Medicated as per EMAR. Pt to be discharged.

## 2022-07-19 NOTE — ED ADULT TRIAGE NOTE - CHIEF COMPLAINT QUOTE
Pt brought in by EMS from home complaining bilateral leg wounds and pain. Pt noted to have dressings on both legs. Pt denies chest pain, sob, n/v/d, fever or chills.

## 2022-07-19 NOTE — ED PROVIDER NOTE - ATTENDING APP SHARED VISIT CONTRIBUTION OF CARE
I have evaluated the patient and agree with the documentation and assessment as made by the AMRITA. We have discussed plan of care and work up for the patient.   This was a shared visit with the AMRITA. I reviewed and verified the documentation and independently performed the documented:   History, Exam and Medical Decision Making.    89F, hx of DM, who presents with bilateral leg wounds. Was seen in this ED 2 weeks prior for bilateral leg wounds - workup at the time found no DVT and patient was started on doxycycline. Patient has finished the course and was taking lasix. Noted that the lasix decreased the size of his legs but the doxycycline did not help very much. Has follow up with outpatient vascular but has not yet been evaluated by them. Presents today with similar complaint of b/l leg discoloration and mild swelling b/l. Of note, patient has run out of lasix over of the past week. No headaches, fevers, chills, cough, sputum, cp, sob, abdominal pain, nvd. Physical: afebrile, well appearing, rrr, ctabl, abdomen soft, chronic venous stasis changes and 1+ pitting edema of the LE bilaterally. Not excessively warm to touch. No erythema. No crepitus. No open skin wounds. Legs have a shiny like appearance and without hair. Plan/Impression: likely chronic venous stasis changes and mild volume overload. I do not suspect that this is cellulitis given the b/l nature, non-responsiveness to ABX, and shiny appearance without hair (secondary to vasculopathy). Will prescribe lasix, obtain closer vascular referral, refer for compression stockings and instructed on proper leg elevation.      89 yo M with PMHx of DM, here with c.o bilateral leg pain/ swelling/ wounds  was seen here 3 weeks ago, for similar complaints was sent home on doxycycline and given dx of cellulitis.

## 2022-07-19 NOTE — PROVIDER CONTACT NOTE (OTHER) - BACKGROUND
SW contacted by medical provider advising that pt is medically cleared for DC however requires assistance with transportation.

## 2022-07-19 NOTE — ED PROVIDER NOTE - OBJECTIVE STATEMENT
89 yo M with PMHx of DM, here with c.o bilateral leg pain/ swelling/ wounds  was seen here 3 weeks ago, for similar complaints was sent home on doxycycline and given dx of cellulitis. 89 yo M with PMHx of DM, here with c.o bilateral leg pain/ swelling/ wounds, unclear when it first began but was evaluated here 2/ 3 weeks ago for same complaint, labs unactionable and b/l DVT studies negative, was told he had a possible infection/ cellulitis sent home on doxycycline, reports no improvement with the antibiotics, patient went to the PMD was given Furosemide, with improvement but ran out. Patient also given a referral for vascular specialist but was unsuccessful in getting an appointment. Presents back here to be reevaluated. Denies fevers/ chills, cp/ sob, abdominal pain, nausea ,vomiting, lightheadedness/ dizziness.

## 2022-07-19 NOTE — PROVIDER CONTACT NOTE (OTHER) - ASSESSMENT
SW met with pt at bedside to discuss as pt does not have benefits for ambulette. Pt was brought into the hospital via ambulance and they did not take his mobility device. Pt does not meet criteria for ambulance. Pt reports that he has no family able to assist/provide transportation. Pt reports that he does not have credit cards and after paying their bills have little left. Pt unable to afford the cost of a wheelchair ambulette. Pt also not safe for taxi transport at this time. BOBBY discussed with SW supervisor DONA who approved bill-back. BOBBY arranged 3pm wheelchair ambulette pickup for 3pm with [facility] (trip#[id number]). No other SW needs identified at this time.

## 2022-07-19 NOTE — ED PROVIDER NOTE - PATIENT PORTAL LINK FT
You can access the FollowMyHealth Patient Portal offered by [facility] by registering at the following website: [email or web address]. By joining [facility]’s FollowMyHealth portal, you will also be able to view your health information using other applications (apps) compatible with our system.

## 2022-07-19 NOTE — ED PROVIDER NOTE - CLINICAL SUMMARY MEDICAL DECISION MAKING FREE TEXT BOX
89 yo M with PMHx of DM, here with c.o bilateral leg pain/ swelling/ wounds, unclear when it first began but was evaluated here 2/ 3 weeks ago for same complaint, labs unactionable and b/l DVT studies negative, was told he had a possible infection/ cellulitis sent home on doxycycline, reports no improvement with the antibiotics, patient went to the PMD was given Furosemide, with improvement but ran out. Patient also given a referral for vascular specialist but was unsuccessful in getting an appointment.  likely venous statis.   no clinical signs of infection/ cellulitis  plan for pain control, diuretic, DC coordinator to assist with vascular f/u and compression stockings.  will dc with ace bandages for now to help compress.

## 2022-08-02 ENCOUNTER — APPOINTMENT (OUTPATIENT)
Dept: VASCULAR SURGERY | Facility: CLINIC | Age: 87
End: 2022-08-02

## 2022-08-02 VITALS
HEIGHT: 67 IN | WEIGHT: 195 LBS | HEART RATE: 78 BPM | DIASTOLIC BLOOD PRESSURE: 76 MMHG | BODY MASS INDEX: 30.61 KG/M2 | TEMPERATURE: 98 F | SYSTOLIC BLOOD PRESSURE: 131 MMHG

## 2022-08-02 DIAGNOSIS — E11.9 TYPE 2 DIABETES MELLITUS W/OUT COMPLICATIONS: ICD-10-CM

## 2022-08-02 PROCEDURE — 93970 EXTREMITY STUDY: CPT

## 2022-08-02 PROCEDURE — 29580 STRAPPING UNNA BOOT: CPT | Mod: 50

## 2022-08-02 PROCEDURE — 93923 UPR/LXTR ART STDY 3+ LVLS: CPT

## 2022-08-02 PROCEDURE — 99204 OFFICE O/P NEW MOD 45 MIN: CPT | Mod: 25

## 2022-08-02 RX ORDER — GABAPENTIN 100 MG/1
100 CAPSULE ORAL
Qty: 270 | Refills: 0 | Status: ACTIVE | COMMUNITY
Start: 2022-06-02

## 2022-08-02 RX ORDER — DAPAGLIFLOZIN 5 MG/1
5 TABLET, FILM COATED ORAL
Qty: 30 | Refills: 0 | Status: ACTIVE | COMMUNITY
Start: 2022-06-28

## 2022-08-02 RX ORDER — SITAGLIPTIN 100 MG/1
TABLET, FILM COATED ORAL
Refills: 0 | Status: ACTIVE | COMMUNITY

## 2022-08-02 RX ORDER — FUROSEMIDE 20 MG/1
20 TABLET ORAL
Qty: 7 | Refills: 0 | Status: ACTIVE | COMMUNITY
Start: 2022-07-06

## 2022-08-02 RX ORDER — ERYTHROMYCIN 5 MG/G
5 OINTMENT OPHTHALMIC
Qty: 4 | Refills: 0 | Status: ACTIVE | COMMUNITY
Start: 2022-04-06

## 2022-08-02 NOTE — PHYSICAL EXAM
[2+] : left 2+ [0] : left 0 [Ankle Swelling (On Exam)] : present [Ankle Swelling On The Left] : moderate [] : bilaterally [Ankle Swelling Bilaterally] : severe [No Rash or Lesion] : No rash or lesion [Alert] : alert [Calm] : calm [JVD] : no jugular venous distention  [Normal Breath Sounds] : Normal breath sounds [Normal Rate and Rhythm] : normal rate and rhythm [Varicose Veins Of Lower Extremities] : not present [Skin Ulcer] : no ulcer [de-identified] : appears well

## 2022-08-02 NOTE — ASSESSMENT
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

## 2022-08-02 NOTE — HISTORY OF PRESENT ILLNESS
[FreeTextEntry1] : 89 yo male with history of DM, possible dvt in the past and right ankle surgery about 8 years ago now with b/l lower extremity pain and swelling also associated with weeping wound of the right lower extremity.  pt states that he has had multiple trips to the ed without any solution to the lower extremity wounds/weeping.  pt states that the pain in the legs keeps him up at night.

## 2022-08-09 ENCOUNTER — APPOINTMENT (OUTPATIENT)
Dept: VASCULAR SURGERY | Facility: CLINIC | Age: 87
End: 2022-08-09

## 2022-08-09 VITALS
DIASTOLIC BLOOD PRESSURE: 73 MMHG | HEART RATE: 77 BPM | HEIGHT: 67 IN | BODY MASS INDEX: 30.61 KG/M2 | WEIGHT: 195 LBS | SYSTOLIC BLOOD PRESSURE: 135 MMHG

## 2022-08-09 PROCEDURE — 99213 OFFICE O/P EST LOW 20 MIN: CPT | Mod: 25

## 2022-08-09 PROCEDURE — 29580 STRAPPING UNNA BOOT: CPT | Mod: 50

## 2022-08-09 NOTE — HISTORY OF PRESENT ILLNESS
[FreeTextEntry1] : 89 yo male with history of dm, possible dvt in the past and right ankle surgery about 8 years ago now with b/l lower extremity pain and swelling also associated with weeping presents today for Unna boot change.  Patient reports continued weeping around the digit and dorsum of the foot

## 2022-08-09 NOTE — PHYSICAL EXAM
[Ankle Swelling (On Exam)] : present [Ankle Swelling On The Right] : mild [] : bilaterally [Ankle Swelling Bilaterally] : severe [No Rash or Lesion] : No rash or lesion [Alert] : alert [Calm] : calm [JVD] : no jugular venous distention  [Varicose Veins Of Lower Extremities] : not present [de-identified] : Appears well [de-identified] : Dry skin flaking skin bilateral lower extremities moderate swelling bilaterally with friable skin over the right lateral malleolus

## 2022-08-09 NOTE — ASSESSMENT
[FreeTextEntry1] : 89 yo male with history of dm, possible dvt in the past and right ankle surgery about 8 years ago now with b/l lower extremity pain and swelling also associated with weeping presents today for Unna boot change\par \par Edema much improved we will apply Unna boots bilat  on for an additional week.\par \par Patient to follow-up in 1 week

## 2022-08-16 ENCOUNTER — APPOINTMENT (OUTPATIENT)
Dept: VASCULAR SURGERY | Facility: CLINIC | Age: 87
End: 2022-08-16

## 2022-08-16 PROCEDURE — 99214 OFFICE O/P EST MOD 30 MIN: CPT

## 2022-08-16 RX ORDER — FLUOCINONIDE 0.5 MG/G
0.05 CREAM TOPICAL TWICE DAILY
Qty: 90 | Refills: 2 | Status: ACTIVE | COMMUNITY
Start: 2022-08-16 | End: 1900-01-01

## 2022-08-16 NOTE — HISTORY OF PRESENT ILLNESS
[FreeTextEntry1] : 89 yo male with history of dm, possible dvt in the past and right ankle surgery about 8 years ago now with b/l lower extremity pain and swelling also associated with weeping presents today for Unna boot change.  Patient reports improved weeping around the digit and dorsum of the foot

## 2022-08-16 NOTE — ASSESSMENT
[FreeTextEntry1] : 89 yo male with history of DM, possible dvt in the past and right ankle surgery about 8 years ago now with b/l lower extremity pain and swelling also associated with weeping presents today for Unna boot change\par \par Edema much improved \par discontinue Unna\par compression stockings\par lidex for inflammation

## 2022-08-16 NOTE — PHYSICAL EXAM
[JVD] : no jugular venous distention  [Normal Breath Sounds] : Normal breath sounds [Normal Rate and Rhythm] : normal rate and rhythm [2+] : left 2+ [Ankle Swelling (On Exam)] : present [Ankle Swelling On The Right] : mild [Varicose Veins Of Lower Extremities] : not present [] : bilaterally [Ankle Swelling Bilaterally] : severe [No Rash or Lesion] : No rash or lesion [Alert] : alert [Calm] : calm [de-identified] : Appears well [de-identified] : Dry skin flaking skin bilateral lower extremities moderate swelling bilaterally with friable skin over the right lateral malleolus

## 2022-09-02 NOTE — ED ADULT TRIAGE NOTE - TEMPERATURE IN FAHRENHEIT (DEGREES F)
Department of Anesthesiology  Postprocedure Note    Patient: Сергей Hernandez  MRN: [id number]  [place]: 1996  Date of evaluation: 9/2/2022      Procedure Summary     Date: 09/02/22 Room / Location: [address] [place] [address] 03 / [address] [facility], [place], operated by [facility]    Anesthesia Start: 2537 Anesthesia Stop: 9726    Procedure: CHOLECYSTECTOMY LAPAROSCOPIC (Abdomen) Diagnosis:       Cholecystitis      (Cholecystitis [K81.9])    Surgeons: Kat Hogue MD Responsible Provider: Theo Austin MD    Anesthesia Type: General ASA Status: 2          Anesthesia Type: General    Neal Phase I: Neal Score: 10    Neal Phase II: Neal Score: 10      Anesthesia Post Evaluation    Patient location during evaluation: PACU  Patient participation: complete - patient participated  Level of consciousness: awake  Airway patency: patent  Nausea & Vomiting: no nausea and no vomiting  Complications: no  Cardiovascular status: hemodynamically stable  Respiratory status: acceptable  Hydration status: euvolemic
97.8

## 2022-09-28 NOTE — PATIENT PROFILE ADULT - VISION (WITH CORRECTIVE LENSES IF THE PATIENT USUALLY WEARS THEM):
Normal vision: sees adequately in most situations; can see medication labels, newsprint Continue Regimen: Dupixent, TMC cream Detail Level: Zone Initiate Treatment: Fluoc. 0.05% cream Continue Regimen: Keto 2% shampoo, Fluoc.  0.05% solution

## 2023-02-07 ENCOUNTER — APPOINTMENT (OUTPATIENT)
Dept: VASCULAR SURGERY | Facility: CLINIC | Age: 88
End: 2023-02-07
Payer: MEDICARE

## 2023-02-07 PROCEDURE — 99214 OFFICE O/P EST MOD 30 MIN: CPT

## 2023-02-07 NOTE — HISTORY OF PRESENT ILLNESS
[FreeTextEntry1] : 89 yo male with history of dm, possible dvt in the past and right ankle surgery about 8 years ago now with b/l lower extremity pain and swelling patient has been using a lymphedema pump.  Recently patient began complaining of pain particularly in the instep of the right foot.  No pain in the toes.  No history of numbness.

## 2023-02-07 NOTE — PHYSICAL EXAM
[JVD] : no jugular venous distention  [Normal Breath Sounds] : Normal breath sounds [Normal Rate and Rhythm] : normal rate and rhythm [2+] : left 2+ [1+] : left 1+ [Ankle Swelling (On Exam)] : present [Ankle Swelling On The Right] : mild [Varicose Veins Of Lower Extremities] : not present [] : bilaterally [Ankle Swelling Bilaterally] : severe [No Rash or Lesion] : No rash or lesion [Alert] : alert [Calm] : calm [de-identified] : Appears well [de-identified] : Dry skin flaking skin bilateral lower extremities moderate swelling bilaterally with friable skin over the right lateral malleolus

## 2023-02-07 NOTE — ASSESSMENT
[FreeTextEntry1] : 89 yo male with history of DM, possible dvt in the past and right ankle surgery about 8 years ago now with b/l lower extremity pain and swelling\par \par \par Patient has bilateral palpable pedal pulses.  It is also noted that patient is flat-footed bilaterally and wearing improper shoes.  I have suggested he make an appointment to see podiatry.  No vascular issues at this time.  Patient may continue with his lymphedema pump.

## 2023-02-08 NOTE — ED PROVIDER NOTE - NS ED ATTENDING STATEMENT MOD
LIZBETH:    \" Summary:  No charge for this visit since genetics could not be reviewed. He will reschedule after he obtains the results. \"    Domenico Jc MD, FACC  Adjunct Clinical Professor of Medicine, [facility]  Past President, [place] Chapter, [employer]       I have personally seen and examined this patient.  I have fully participated in the care of this patient. I have reviewed all pertinent clinical information, including history, physical exam, plan and the Resident’s note and agree except as noted.

## 2023-03-06 ENCOUNTER — EMERGENCY (EMERGENCY)
Facility: HOSPITAL | Age: 88
LOS: 1 days | Discharge: HOME | End: 2023-03-06
Attending: STUDENT IN AN ORGANIZED HEALTH CARE EDUCATION/TRAINING PROGRAM | Admitting: STUDENT IN AN ORGANIZED HEALTH CARE EDUCATION/TRAINING PROGRAM
Payer: MEDICARE

## 2023-03-06 VITALS
TEMPERATURE: 98 F | RESPIRATION RATE: 16 BRPM | SYSTOLIC BLOOD PRESSURE: 116 MMHG | DIASTOLIC BLOOD PRESSURE: 54 MMHG | OXYGEN SATURATION: 100 % | HEART RATE: 78 BPM

## 2023-03-06 VITALS
RESPIRATION RATE: 16 BRPM | TEMPERATURE: 98 F | SYSTOLIC BLOOD PRESSURE: 131 MMHG | DIASTOLIC BLOOD PRESSURE: 68 MMHG | OXYGEN SATURATION: 100 % | HEART RATE: 66 BPM

## 2023-03-06 DIAGNOSIS — Z87.81 PERSONAL HISTORY OF (HEALED) TRAUMATIC FRACTURE: Chronic | ICD-10-CM

## 2023-03-06 LAB
ALBUMIN SERPL ELPH-MCNC: 4.6 G/DL — SIGNIFICANT CHANGE UP (ref 3.3–5)
ALP SERPL-CCNC: 94 U/L — SIGNIFICANT CHANGE UP (ref 40–120)
ALT FLD-CCNC: 18 U/L — SIGNIFICANT CHANGE UP (ref 4–41)
ANION GAP SERPL CALC-SCNC: 11 MMOL/L — SIGNIFICANT CHANGE UP (ref 7–14)
APTT BLD: 28.3 SEC — SIGNIFICANT CHANGE UP (ref 27–36.3)
AST SERPL-CCNC: 14 U/L — SIGNIFICANT CHANGE UP (ref 4–40)
BASOPHILS # BLD AUTO: 0.02 K/UL — SIGNIFICANT CHANGE UP (ref 0–0.2)
BASOPHILS NFR BLD AUTO: 0.2 % — SIGNIFICANT CHANGE UP (ref 0–2)
BILIRUB SERPL-MCNC: 0.5 MG/DL — SIGNIFICANT CHANGE UP (ref 0.2–1.2)
BLD GP AB SCN SERPL QL: NEGATIVE — SIGNIFICANT CHANGE UP
BUN SERPL-MCNC: 18 MG/DL — SIGNIFICANT CHANGE UP (ref 7–23)
CALCIUM SERPL-MCNC: 10 MG/DL — SIGNIFICANT CHANGE UP (ref 8.4–10.5)
CHLORIDE SERPL-SCNC: 107 MMOL/L — SIGNIFICANT CHANGE UP (ref 98–107)
CO2 SERPL-SCNC: 28 MMOL/L — SIGNIFICANT CHANGE UP (ref 22–31)
CREAT SERPL-MCNC: 1.48 MG/DL — HIGH (ref 0.5–1.3)
CRP SERPL-MCNC: <3 MG/L — SIGNIFICANT CHANGE UP
EGFR: 44 ML/MIN/1.73M2 — LOW
EOSINOPHIL # BLD AUTO: 0.33 K/UL — SIGNIFICANT CHANGE UP (ref 0–0.5)
EOSINOPHIL NFR BLD AUTO: 3.9 % — SIGNIFICANT CHANGE UP (ref 0–6)
ERYTHROCYTE [SEDIMENTATION RATE] IN BLOOD: 20 MM/HR — HIGH (ref 1–15)
FLUAV AG NPH QL: SIGNIFICANT CHANGE UP
FLUBV AG NPH QL: SIGNIFICANT CHANGE UP
GLUCOSE SERPL-MCNC: 97 MG/DL — SIGNIFICANT CHANGE UP (ref 70–99)
HCT VFR BLD CALC: 44.6 % — SIGNIFICANT CHANGE UP (ref 39–50)
HGB BLD-MCNC: 14.3 G/DL — SIGNIFICANT CHANGE UP (ref 13–17)
IANC: 4.84 K/UL — SIGNIFICANT CHANGE UP (ref 1.8–7.4)
IMM GRANULOCYTES NFR BLD AUTO: 0.4 % — SIGNIFICANT CHANGE UP (ref 0–0.9)
INR BLD: 0.99 RATIO — SIGNIFICANT CHANGE UP (ref 0.88–1.16)
LYMPHOCYTES # BLD AUTO: 2.56 K/UL — SIGNIFICANT CHANGE UP (ref 1–3.3)
LYMPHOCYTES # BLD AUTO: 30.3 % — SIGNIFICANT CHANGE UP (ref 13–44)
MCHC RBC-ENTMCNC: 31.8 PG — SIGNIFICANT CHANGE UP (ref 27–34)
MCHC RBC-ENTMCNC: 32.1 GM/DL — SIGNIFICANT CHANGE UP (ref 32–36)
MCV RBC AUTO: 99.1 FL — SIGNIFICANT CHANGE UP (ref 80–100)
MONOCYTES # BLD AUTO: 0.67 K/UL — SIGNIFICANT CHANGE UP (ref 0–0.9)
MONOCYTES NFR BLD AUTO: 7.9 % — SIGNIFICANT CHANGE UP (ref 2–14)
NEUTROPHILS # BLD AUTO: 4.84 K/UL — SIGNIFICANT CHANGE UP (ref 1.8–7.4)
NEUTROPHILS NFR BLD AUTO: 57.3 % — SIGNIFICANT CHANGE UP (ref 43–77)
NRBC # BLD: 0 /100 WBCS — SIGNIFICANT CHANGE UP (ref 0–0)
NRBC # FLD: 0 K/UL — SIGNIFICANT CHANGE UP (ref 0–0)
PLATELET # BLD AUTO: 250 K/UL — SIGNIFICANT CHANGE UP (ref 150–400)
POTASSIUM SERPL-MCNC: 4.4 MMOL/L — SIGNIFICANT CHANGE UP (ref 3.5–5.3)
POTASSIUM SERPL-SCNC: 4.4 MMOL/L — SIGNIFICANT CHANGE UP (ref 3.5–5.3)
PROT SERPL-MCNC: 7.7 G/DL — SIGNIFICANT CHANGE UP (ref 6–8.3)
PROTHROM AB SERPL-ACNC: 11.5 SEC — SIGNIFICANT CHANGE UP (ref 10.5–13.4)
RBC # BLD: 4.5 M/UL — SIGNIFICANT CHANGE UP (ref 4.2–5.8)
RBC # FLD: 12.3 % — SIGNIFICANT CHANGE UP (ref 10.3–14.5)
RH IG SCN BLD-IMP: POSITIVE — SIGNIFICANT CHANGE UP
RSV RNA NPH QL NAA+NON-PROBE: SIGNIFICANT CHANGE UP
SARS-COV-2 RNA SPEC QL NAA+PROBE: SIGNIFICANT CHANGE UP
SODIUM SERPL-SCNC: 146 MMOL/L — HIGH (ref 135–145)
WBC # BLD: 8.45 K/UL — SIGNIFICANT CHANGE UP (ref 3.8–10.5)
WBC # FLD AUTO: 8.45 K/UL — SIGNIFICANT CHANGE UP (ref 3.8–10.5)

## 2023-03-06 PROCEDURE — 99285 EMERGENCY DEPT VISIT HI MDM: CPT

## 2023-03-06 PROCEDURE — 73610 X-RAY EXAM OF ANKLE: CPT | Mod: 26,RT

## 2023-03-06 PROCEDURE — 73630 X-RAY EXAM OF FOOT: CPT | Mod: 26,RT

## 2023-03-06 PROCEDURE — 73590 X-RAY EXAM OF LOWER LEG: CPT | Mod: 26,RT

## 2023-03-06 RX ORDER — ACETAMINOPHEN 500 MG
975 TABLET ORAL ONCE
Refills: 0 | Status: COMPLETED | OUTPATIENT
Start: 2023-03-06 | End: 2023-03-06

## 2023-03-06 RX ADMIN — Medication 975 MILLIGRAM(S): at 16:51

## 2023-03-06 RX ADMIN — Medication 975 MILLIGRAM(S): at 15:51

## 2023-03-06 NOTE — ED ADULT NURSE NOTE - OBJECTIVE STATEMENT
pt aox4, pleasant and cooperative, ambulates with pain- comes from home for worsening right foot pain that extends up to the calf. pt denies injury or trauma. skin is discolored, tender and warm with weeping drainage noted. pedal pulses present. pt able to wiggle toes and raise leg. minimal relief with tylenol take at home this morning. pt aox4, pleasant and cooperative, ambulates with pain- comes from home for worsening right foot pain that extends up to the calf. pt denies injury or trauma. skin is discolored, tender and warm with weeping drainage noted. pedal pulses present. pt able to wiggle toes and raise leg. minimal relief with tylenol take at home this morning. right ac 20g inserted using aseptic technique, blood return noted and flushed well. labs collected. stretcher in lowest position.

## 2023-03-06 NOTE — ED PROVIDER NOTE - CLINICAL SUMMARY MEDICAL DECISION MAKING FREE TEXT BOX
89-year-old male with history of diabetes presenting with right foot pain..  Patient has chronic lower extremity pain and discoloration.  Patient saw podiatrist on Friday (does not remember which one). Pt coming in today for worsened pain causing difficulty ambulating. BLE mostly tender in ankle and foot. Very remote hx of ankle fracture in the leg, no new trauma. No fever, cp, sob, abd pain, n/v. Exam shows RLE discoloration from mid-calf down, pulses intact, ankle and foot ttp, slight weeping with dry skin. no erythema. Low suspicion for acute ischemic limb, likely chronic PAD. Will get XR, labs, reassess.

## 2023-03-06 NOTE — ED PROVIDER NOTE - PROGRESS NOTE DETAILS
Abby Sofia- podiatry consulted. Abby Sofia- spoke to podiatry who has low clinical suspicion for osteo despite xray read, no elevated wbc or crp. recommends outpt f/u with podiatry.

## 2023-03-06 NOTE — PROVIDER CONTACT NOTE (OTHER) - ASSESSMENT
Arranged [facility] [phone]. Trip# [id number]. P/U 7 PM. Pt has Managed Medicare and was unable to obtain Auth.

## 2023-03-06 NOTE — CONSULT NOTE ADULT - ASSESSMENT
89M with RLE pain  - pt seen and evaluated  - afebrile, no leukocytosis, CRP 0.88  - right ankle and lower extremity circumferential wounds to subQ with serous drainage, no acute signs of infection, b/l lower extremity xerosis  - right lower extermity xray - wet read: no gas, no OM  - using normal saline, RLE wounds were flushed and cleaned, and xeroform and compressive dressing applied  - pt to f/u outpatient at [address] [place] [address] [facility] w/ Dr. Barraza at [phone] within 1 week of d/c  - discussed with attending

## 2023-03-06 NOTE — ED PROVIDER NOTE - PHYSICAL EXAMINATION
General appearance: NAD, conversant, afebrile    Eyes: anicteric sclerae, JERRI, EOMI   HENT: Atraumatic; oropharynx clear, MMM and no ulcerations, no pharyngeal erythema or exudate   Neck: Trachea midline; Full range of motion, supple   Pulm: CTA bl, normal respiratory effort and no intercostal retractions, normal work of breathing   CV: RRR, No murmurs, rubs, or gallops. 2+ peripheral pulses.   Abdomen: Soft, non-tender, non-distended; no guarding or rebound   Extremities: RLE discoloration from mid-calf down, pulses intact, ankle and foot ttp, slight weeping with dry skin. no erythema.   Skin: Dry, normal temperature, turgor and texture; no rash, ulcers or subcutaneous nodules   Psych: Appropriate affect, cooperative; alert and oriented to person, place and time

## 2023-03-06 NOTE — ED ADULT TRIAGE NOTE - CHIEF COMPLAINT QUOTE
Pt brought in from home for right foot pain, noted to have a discolored extremity with weeping edema, weaker pulse. Ambulatory with cane. Pt was seen by his podiatrist and told to go to his PCP, was unable to get a timely appointment.

## 2023-03-06 NOTE — ED PROVIDER NOTE - OBJECTIVE STATEMENT
89-year-old male with history of diabetes presenting with right foot pain..  Patient has chronic lower extremity pain and discoloration.  Patient saw podiatrist on Friday (does not remember which one). Pt coming in today for worsened pain causing difficulty ambulating. BLE mostly tender in ankle and foot. Very remote hx of ankle fracture in the leg, no new trauma. No fever, cp, sob, abd pain, n/v.

## 2023-03-06 NOTE — ED ADULT NURSE REASSESSMENT NOTE - NS ED NURSE REASSESS COMMENT FT1
Pt to be discharged home via senior care. Pt A&Ox4, ambulatory with cane. Senior care here to pick him up. pt RR equal and unlabored. VS stable. Care plan continued. Safety maintained. Comfort measures provided. IV d/c by me.
Pt resting in bed comfortably A&Ox4, ambulatory with cane at baseline complaining of right foot pain 5/10. Redness noted to the right foot. Pt with podiatry MD, at bedside.  Pt medicated as per ordered for pain. Pt RR equal and unlabored. VS stable. FS obtained. Care plan continued. Comfort measures provided. Safety maintained. Patient to be discharged awaiting social work and taxi at this time.

## 2023-03-06 NOTE — ED ADULT NURSE NOTE - NSIMPLEMENTINTERV_GEN_ALL_ED
Implemented All Fall Risk Interventions:  [place] to call system. Call bell, personal items and telephone within reach. Instruct patient to call for assistance. Room bathroom lighting operational. Non-slip footwear when patient is off stretcher. Physically safe environment: no spills, clutter or unnecessary equipment. Stretcher in lowest position, wheels locked, appropriate side rails in place. Provide visual cue, wrist band, yellow gown, etc. Monitor gait and stability. Monitor for mental status changes and reorient to person, place, and time. Review medications for side effects contributing to fall risk. Reinforce activity limits and safety measures with patient and family.

## 2023-03-06 NOTE — ED PROVIDER NOTE - ATTENDING CONTRIBUTION TO CARE
89yo M ho DM presnets with chronic foot pain, ongoing, now pain is worsening and noted weeping from ankle. right foot and ankle noted to be discolored, foot to mid shin, + DP pulses,   weeping noted   likely needs vascular eval for PAD  will check labs, pain contorl, xrays, podiatry eval

## 2023-03-06 NOTE — CONSULT NOTE ADULT - SUBJECTIVE AND OBJECTIVE BOX
Patient is a 89y old  Male who presents with a chief complaint of right lower extremity pain    HPI:  89-year-old male with history of diabetes presenting with right foot pain..  Patient has chronic lower extremity pain and discoloration.  Patient saw podiatrist on Friday (does not remember which one). Pt coming in today for worsened pain causing difficulty ambulating. BLE mostly tender in ankle and foot. Very remote hx of ankle fracture in the leg, no new trauma. No fever, cp, sob, abd pain, n/v    PAST MEDICAL & SURGICAL HISTORY:  Diabetes mellitus      H/O fracture of ankle  Rt ankle repair          MEDICATIONS  (STANDING):  acetaminophen     Tablet .. 975 milliGRAM(s) Oral once    MEDICATIONS  (PRN):      Allergies    No Known Allergies    Intolerances        VITALS:    Vital Signs Last 24 Hrs  T(C): 36.7 (06 Mar 2023 12:33), Max: 36.7 (06 Mar 2023 12:33)  T(F): 98 (06 Mar 2023 12:33), Max: 98 (06 Mar 2023 12:33)  HR: 66 (06 Mar 2023 12:33) (66 - 66)  BP: 131/68 (06 Mar 2023 12:33) (131/68 - 131/68)  BP(mean): --  RR: 16 (06 Mar 2023 12:33) (16 - 16)  SpO2: 100% (06 Mar 2023 12:33) (100% - 100%)    Parameters below as of 06 Mar 2023 12:33  Patient On (Oxygen Delivery Method): room air        LABS:                          14.3   8.45  )-----------( 250      ( 06 Mar 2023 14:59 )             44.6       03-06    146<H>  |  107  |  18  ----------------------------<  97  4.4   |  28  |  1.48<H>    Ca    10.0      06 Mar 2023 14:59    TPro  7.7  /  Alb  4.6  /  TBili  0.5  /  DBili  x   /  AST  14  /  ALT  18  /  AlkPhos  94  03-06      CAPILLARY BLOOD GLUCOSE      POCT Blood Glucose.: 95 mg/dL (06 Mar 2023 15:47)  POCT Blood Glucose.: 120 mg/dL (06 Mar 2023 12:32)      PT/INR - ( 06 Mar 2023 14:59 )   PT: 11.5 sec;   INR: 0.99 ratio         PTT - ( 06 Mar 2023 14:59 )  PTT:28.3 sec    LOWER EXTREMITY PHYSICAL EXAM:    Vascular: DP/PT 2/4, B/L, CFT <3 seconds B/L, Temperature gradient warm to warm, B/L.   Neuro: Epicritic sensation intact to the level of digits, B/L.  Musculoskeletal/Ortho: edema to RLE  Skin: right ankle and lower extremity circumferential wounds to subQ with serous drainage, no acute signs of infection, b/l lower extremity xerosis      RADIOLOGY & ADDITIONAL STUDIES:    xray - wet read: no gas, no OM

## 2023-03-06 NOTE — ED PROVIDER NOTE - NSFOLLOWUPINSTRUCTIONS_ED_ALL_ED_FT
You were seen in the ER for foot pain. Your lab results were within normal limits. The podiatry team evaluated you and recommended follow up outpatient with podiatry wound care.  Please follow up with Dr. Montero.    Dr. Gawlick  [facility]  [address]  [phone]    Please follow up with your primary care doctor.    Please return to the ER if you have worsening symptoms including fever, chest pain, shortness of breath, abdominal pain, nausea, vomiting, diarrhea, weakness or lightheadedness/fainting.

## 2023-03-06 NOTE — ED PROVIDER NOTE - PATIENT PORTAL LINK FT
You can access the FollowMyHealth Patient Portal offered by [facility] by registering at the following website: [email or web address]. By joining [employer]’s FollowMyHealth portal, you will also be able to view your health information using other applications (apps) compatible with our system.

## 2023-03-14 NOTE — PROGRESS NOTE ADULT - ASSESSMENT
87 y.o male with PMHx of DM who presents to ED with daughter c/o RLE swelling x 1 week and oozing of skin x 3 days, with suspicion for LE cellulitis. no

## 2023-06-02 ENCOUNTER — APPOINTMENT (OUTPATIENT)
Dept: VASCULAR SURGERY | Facility: CLINIC | Age: 88
End: 2023-06-02
Payer: MEDICARE

## 2023-06-02 VITALS
SYSTOLIC BLOOD PRESSURE: 134 MMHG | DIASTOLIC BLOOD PRESSURE: 77 MMHG | WEIGHT: 180 LBS | TEMPERATURE: 97.7 F | HEART RATE: 74 BPM | BODY MASS INDEX: 28.25 KG/M2 | HEIGHT: 67 IN | OXYGEN SATURATION: 98 %

## 2023-06-02 DIAGNOSIS — I89.0 LYMPHEDEMA, NOT ELSEWHERE CLASSIFIED: ICD-10-CM

## 2023-06-02 DIAGNOSIS — I83.899 VARICOSE VEINS OF UNSPECIFIED LOWER EXTREMITY WITH OTHER COMPLICATIONS: ICD-10-CM

## 2023-06-02 DIAGNOSIS — M79.605 PAIN IN LEFT LEG: ICD-10-CM

## 2023-06-02 DIAGNOSIS — I87.8 OTHER SPECIFIED DISORDERS OF VEINS: ICD-10-CM

## 2023-06-02 DIAGNOSIS — I87.2 VENOUS INSUFFICIENCY (CHRONIC) (PERIPHERAL): ICD-10-CM

## 2023-06-02 DIAGNOSIS — R60.9 EDEMA, UNSPECIFIED: ICD-10-CM

## 2023-06-02 DIAGNOSIS — M79.604 PAIN IN RIGHT LEG: ICD-10-CM

## 2023-06-02 PROCEDURE — 99205 OFFICE O/P NEW HI 60 MIN: CPT

## 2023-06-02 NOTE — DATA REVIEWED
[FreeTextEntry1] : Patient had arterial and venous Doppler last year with Dr. Menchaca which I reviewed and discussed with the patient.  There was no significant peripheral arterial disease and no DVT.

## 2023-06-02 NOTE — REVIEW OF SYSTEMS
[Lower Ext Edema] : lower extremity edema [Arthralgias] : arthralgias [Joint Swelling] : joint swelling [Joint Stiffness] : joint stiffness [Limb Pain] : limb pain [Limb Swelling] : limb swelling [As Noted in HPI] : as noted in HPI [Negative] : Heme/Lymph

## 2023-06-02 NOTE — VITALS
[Dull] : dull [FreeTextEntry3] : Right ankle [FreeTextEntry1] : Compression [FreeTextEntry2] : Walking

## 2023-06-02 NOTE — ASSESSMENT
[FreeTextEntry1] : HyperpigmentedClinically it appears that the patient has a chronic venous stasis venous insufficiency of both lower extremity with probably underlying lymphedema acquired along with patient and lipodermatosclerosis with a history of right ankle fracture in the past which is well-healed and no open wounds.

## 2023-06-02 NOTE — HISTORY OF PRESENT ILLNESS
[FreeTextEntry1] : This is a 89-year-old male who has been referred for evaluation of bilateral lower extremity discoloration pain and swelling.  According to the history obtained from the patient he has seen Dr. Mckinley Menchaca as well as has seen  for his venous stasis.  He has been recently treated with Unna boots on both lower extremity which were removed by me today in the office.  The patient states that he had a right ankle surgery in 2015 secondary to fracture of the ankle and has a hardware in the ankle.  Since that time his right leg has been bothering him he has a chronic swelling on the right foot and the leg and discomfort.  He also has noticed increased discoloration of the right leg and also some in the left leg.  At times he has a more swelling than other days and also has times breakdown of the skin with weeping fluid.

## 2023-07-07 ENCOUNTER — APPOINTMENT (OUTPATIENT)
Dept: VASCULAR SURGERY | Facility: CLINIC | Age: 88
End: 2023-07-07

## 2024-11-16 NOTE — PHYSICAL THERAPY INITIAL EVALUATION ADULT - TRANSFER SAFETY CONCERNS NOTED: SIT/STAND, REHAB EVAL
Attempted to call report x1  
Emergency Department Medical Screening Exam Note    Patient: Austin Rico Age: 23 year old Sex: male   MRN: [id number] : 2001 Encounter Date: 11/15/2024     Vitals:    11/15/24 1742   BP: 111/55   Pulse: 85   Resp: 20   Temp: 99 °F (37.2 °C)   SpO2: 97%   Weight: 70.9 kg (156 lb 4.9 oz)       Austin Rico is a 23 year old male who presents to the ER with abdominal pain and vomiting.  Diffuse abdominal pain along with nausea and vomiting beginning yesterday, states he has been having intermittent muscle spasms starting today.  No fever.     Brief Physical Exam   Awake, alert, mild distress, diffuse abdominal tenderness to palpation on upright exam, RRR, LCTAB    Home Medications  No current outpatient medications     Preliminary Orders  Orders Placed This Encounter    CT ABDOMEN PELVIS W CONTRAST - IV contrast only    Comprehensive Metabolic Panel    CBC with Automated Differential    Lactic Acid, Venous    CBC with Automated Differential (performable only)    COVID/Flu/RSV panel    CBC with Automated Differential    Comprehensive Metabolic Panel    Lipase    Magnesium    CBC with Automated Differential (performable only)    Contact & Droplet with N95 Isolation    ondansetron (ZOFRAN ODT) disintegrating tablet 4 mg       This patient was screened by me for the purpose of initial evaluation.  I have performed a medical screening examination and placed preliminary orders.    11/15/2024  SUE Sol Andrew, PA-C  11/15/24 0994    
Patient reports history of daily marijuana usage, intractable vomiting, abdominal cramping with relief of symptoms with heat.   
Pt medicated per MAR. Pt VSS. Pt noted to be a&o x4, respirations noted to be even and unlabored on room air. Pt's belongings placed in pt labeled bags at the bedside. Pt ready for transport to floor by ED tech.   
decreased weight-shifting ability

## 2025-02-03 ENCOUNTER — EMERGENCY (EMERGENCY)
Facility: HOSPITAL | Age: 89
LOS: 0 days | Discharge: HOME | End: 2025-02-04
Attending: EMERGENCY MEDICINE
Payer: COMMERCIAL

## 2025-02-03 VITALS
WEIGHT: 184.97 LBS | RESPIRATION RATE: 18 BRPM | HEART RATE: 77 BPM | SYSTOLIC BLOOD PRESSURE: 119 MMHG | DIASTOLIC BLOOD PRESSURE: 62 MMHG | HEIGHT: 66 IN | TEMPERATURE: 98 F | OXYGEN SATURATION: 97 %

## 2025-02-03 DIAGNOSIS — M25.552 PAIN IN LEFT HIP: ICD-10-CM

## 2025-02-03 DIAGNOSIS — M54.9 DORSALGIA, UNSPECIFIED: ICD-10-CM

## 2025-02-03 DIAGNOSIS — Z87.81 PERSONAL HISTORY OF (HEALED) TRAUMATIC FRACTURE: Chronic | ICD-10-CM

## 2025-02-03 DIAGNOSIS — M25.551 PAIN IN RIGHT HIP: ICD-10-CM

## 2025-02-03 PROCEDURE — 99285 EMERGENCY DEPT VISIT HI MDM: CPT

## 2025-02-04 VITALS
TEMPERATURE: 97 F | HEART RATE: 76 BPM | OXYGEN SATURATION: 98 % | SYSTOLIC BLOOD PRESSURE: 144 MMHG | RESPIRATION RATE: 18 BRPM | DIASTOLIC BLOOD PRESSURE: 85 MMHG

## 2025-02-04 PROCEDURE — 72192 CT PELVIS W/O DYE: CPT | Mod: 26

## 2025-02-04 PROCEDURE — 72100 X-RAY EXAM L-S SPINE 2/3 VWS: CPT | Mod: 26

## 2025-02-04 PROCEDURE — 72131 CT LUMBAR SPINE W/O DYE: CPT | Mod: 26

## 2025-02-04 PROCEDURE — 73503 X-RAY EXAM HIP UNI 4/> VIEWS: CPT | Mod: 26,RT

## 2025-02-04 RX ORDER — METHOCARBAMOL 500 MG
1000 TABLET ORAL ONCE
Refills: 0 | Status: COMPLETED | OUTPATIENT
Start: 2025-02-04 | End: 2025-02-04

## 2025-02-04 RX ORDER — METHOCARBAMOL 500 MG
2 TABLET ORAL
Qty: 40 | Refills: 0
Start: 2025-02-04 | End: 2025-02-08

## 2025-02-04 RX ORDER — LIDOCAINE HYDROCHLORIDE 30 MG/G
1 CREAM TOPICAL
Qty: 1 | Refills: 0
Start: 2025-02-04

## 2025-02-04 RX ORDER — LIDOCAINE HYDROCHLORIDE 30 MG/G
1 CREAM TOPICAL ONCE
Refills: 0 | Status: COMPLETED | OUTPATIENT
Start: 2025-02-04 | End: 2025-02-04

## 2025-02-04 RX ORDER — IBUPROFEN 600 MG/1
600 TABLET, FILM COATED ORAL ONCE
Refills: 0 | Status: COMPLETED | OUTPATIENT
Start: 2025-02-04 | End: 2025-02-04

## 2025-02-04 RX ADMIN — LIDOCAINE HYDROCHLORIDE 1 PATCH: 30 CREAM TOPICAL at 06:45

## 2025-02-04 RX ADMIN — Medication 1000 MILLIGRAM(S): at 06:43

## 2025-02-04 RX ADMIN — IBUPROFEN 600 MILLIGRAM(S): 600 TABLET, FILM COATED ORAL at 06:44

## 2025-02-04 NOTE — ED PROVIDER NOTE - CLINICAL SUMMARY MEDICAL DECISION MAKING FREE TEXT BOX
This patient is a 89 year old man who presents to the ER c/o right hip and back pain since a fall 1 week ago.  Although triage note reports bilateral hip pain the patient denies.  The pain is constant but was much worse since yesterday and he reports difficulty moving around and ambulating today.  Patient has been taking tylenol for pain.  Pain is currently 10/10 in severity.    Patient well appearing no acute distress.  No tenderness of right hip on exam, normal range of motion, no spinal tenderness or step-off.  Most likely muscular strain or spasm, possible stress fracture of hip.  Will give medication for pain, get Xray and consider CT.    Xray negative for acute pathology.  Xray and CT negative for acute pathology.  Pain more improved and patient ambulating in the ER.  Supportive care and follow-up discussed.

## 2025-02-04 NOTE — ED PROVIDER NOTE - PATIENT PORTAL LINK FT
You can access the FollowMyHealth Patient Portal offered by [facility] by registering at the following website: [email or web address]. By joining [employer]’s FollowMyHealth portal, you will also be able to view your health information using other applications (apps) compatible with our system.

## 2025-02-04 NOTE — ED ADULT NURSE NOTE - NSFALLHARMRISKINTERV_ED_ALL_ED

## 2025-02-04 NOTE — ED PROVIDER NOTE - OBJECTIVE STATEMENT
This patient is a 89 year old man who presents to the ER c/o right hip and back pain since a fall 1 week ago.  Although triage note reports bilateral hip pain the patient denies.  The pain is constant but was much worse since yesterday and he reports difficulty moving around and ambulating today.  Patient has been taking tylenol for pain.  Pain is currently 10/10 in severity.
